# Patient Record
Sex: FEMALE | Race: WHITE | NOT HISPANIC OR LATINO
[De-identification: names, ages, dates, MRNs, and addresses within clinical notes are randomized per-mention and may not be internally consistent; named-entity substitution may affect disease eponyms.]

---

## 2017-01-12 ENCOUNTER — APPOINTMENT (OUTPATIENT)
Dept: HEART AND VASCULAR | Facility: CLINIC | Age: 77
End: 2017-01-12

## 2017-01-12 VITALS
WEIGHT: 134.2 LBS | RESPIRATION RATE: 12 BRPM | DIASTOLIC BLOOD PRESSURE: 79 MMHG | OXYGEN SATURATION: 96 % | SYSTOLIC BLOOD PRESSURE: 130 MMHG | TEMPERATURE: 97.8 F | BODY MASS INDEX: 23.78 KG/M2 | HEART RATE: 89 BPM | HEIGHT: 63 IN

## 2017-01-12 DIAGNOSIS — C76.1 MALIGNANT NEOPLASM OF THORAX: ICD-10-CM

## 2017-01-12 DIAGNOSIS — Z84.89 FAMILY HISTORY OF OTHER SPECIFIED CONDITIONS: ICD-10-CM

## 2017-01-12 DIAGNOSIS — Z82.0 FAMILY HISTORY OF EPILEPSY AND OTHER DISEASES OF THE NERVOUS SYSTEM: ICD-10-CM

## 2017-01-29 ENCOUNTER — EMERGENCY (EMERGENCY)
Facility: HOSPITAL | Age: 77
LOS: 1 days | Discharge: HOME | End: 2017-01-29
Attending: EMERGENCY MEDICINE | Admitting: EMERGENCY MEDICINE
Payer: MEDICARE

## 2017-01-29 VITALS
OXYGEN SATURATION: 96 % | DIASTOLIC BLOOD PRESSURE: 74 MMHG | SYSTOLIC BLOOD PRESSURE: 155 MMHG | RESPIRATION RATE: 18 BRPM | TEMPERATURE: 98 F | WEIGHT: 132.06 LBS | HEART RATE: 100 BPM | HEIGHT: 63 IN

## 2017-01-29 DIAGNOSIS — S83.004A UNSPECIFIED DISLOCATION OF RIGHT PATELLA, INITIAL ENCOUNTER: ICD-10-CM

## 2017-01-29 DIAGNOSIS — M25.561 PAIN IN RIGHT KNEE: ICD-10-CM

## 2017-01-29 PROCEDURE — 99284 EMERGENCY DEPT VISIT MOD MDM: CPT

## 2017-01-29 PROCEDURE — 70450 CT HEAD/BRAIN W/O DYE: CPT

## 2017-01-29 PROCEDURE — 70450 CT HEAD/BRAIN W/O DYE: CPT | Mod: 26

## 2017-01-29 PROCEDURE — 99284 EMERGENCY DEPT VISIT MOD MDM: CPT | Mod: 25

## 2017-01-29 NOTE — ED PROVIDER NOTE - CONSTITUTIONAL, MLM
normal... Well appearing, well nourished, awake, alert, oriented to person, place, time/situation and in mild distress, appears very anxious.

## 2017-01-29 NOTE — ED PROVIDER NOTE - MEDICAL DECISION MAKING DETAILS
76 year old female with left ear pressure and ? paresthesias of left leg x many weeks. Currently asymptomatic, ? anxiety. Head ct unremarkable. Will refer to neuro clinic.

## 2017-01-29 NOTE — ED ADULT TRIAGE NOTE - CHIEF COMPLAINT QUOTE
Patient c/o left ear pressure , head pressure and left leg numbness while walking outside today . Verbalizes going to so much stress at this time . History of panic attack , on xanax daily .

## 2017-01-29 NOTE — ED PROVIDER NOTE - OBJECTIVE STATEMENT
76 year old female with PMH anxiety and HL who presents to ED c/o a pressure in the left side of her head described as a fullness x many weeks. Pt reports that she has told her pmd about this. She has also experienced a "funny sensation" to her left shin. Most recently it occurred while she was walking today. Pt states that in the past she takes her anxiety medication and the symptoms resolve. She wants to make sure "nothing else is wrong." Denies dizziness, headache, vomiting focal weakness, syncope, chest pain, or sob.

## 2017-01-29 NOTE — ED PROVIDER NOTE - ENMT, MLM
Airway patent, Nasal mucosa clear. Mouth with normal mucosa. Throat has no vesicles, no oropharyngeal exudates and uvula is midline. TMs pearly gray, no erythema, no exudates. No mastoid tenderness.

## 2017-01-31 ENCOUNTER — TRANSCRIPTION ENCOUNTER (OUTPATIENT)
Age: 77
End: 2017-01-31

## 2017-02-13 ENCOUNTER — APPOINTMENT (OUTPATIENT)
Dept: INTERNAL MEDICINE | Facility: CLINIC | Age: 77
End: 2017-02-13

## 2017-02-13 ENCOUNTER — LABORATORY RESULT (OUTPATIENT)
Age: 77
End: 2017-02-13

## 2017-02-13 VITALS
DIASTOLIC BLOOD PRESSURE: 70 MMHG | BODY MASS INDEX: 23.74 KG/M2 | SYSTOLIC BLOOD PRESSURE: 120 MMHG | TEMPERATURE: 98 F | WEIGHT: 134 LBS | OXYGEN SATURATION: 95 % | HEIGHT: 63 IN | HEART RATE: 74 BPM | RESPIRATION RATE: 12 BRPM

## 2017-02-13 DIAGNOSIS — R42 DIZZINESS AND GIDDINESS: ICD-10-CM

## 2017-02-14 LAB
ALBUMIN SERPL ELPH-MCNC: 4.2 G/DL
ALP BLD-CCNC: 58 U/L
ALT SERPL-CCNC: 18 U/L
ANION GAP SERPL CALC-SCNC: 14 MMOL/L
AST SERPL-CCNC: 24 U/L
BILIRUB SERPL-MCNC: 0.8 MG/DL
BUN SERPL-MCNC: 15 MG/DL
CALCIUM SERPL-MCNC: 9.6 MG/DL
CHLORIDE SERPL-SCNC: 103 MMOL/L
CO2 SERPL-SCNC: 22 MMOL/L
CREAT SERPL-MCNC: 0.9 MG/DL
GLUCOSE SERPL-MCNC: 97 MG/DL
HBA1C MFR BLD HPLC: 5.6 %
MAGNESIUM SERPL-MCNC: 2.3 MG/DL
POTASSIUM SERPL-SCNC: 4.3 MMOL/L
PROT SERPL-MCNC: 6.8 G/DL
SODIUM SERPL-SCNC: 139 MMOL/L
TSH SERPL-ACNC: 2.17 UIU/ML

## 2017-02-17 ENCOUNTER — CLINICAL ADVICE (OUTPATIENT)
Age: 77
End: 2017-02-17

## 2017-04-23 ENCOUNTER — RX RENEWAL (OUTPATIENT)
Age: 77
End: 2017-04-23

## 2017-05-09 ENCOUNTER — TRANSCRIPTION ENCOUNTER (OUTPATIENT)
Age: 77
End: 2017-05-09

## 2017-05-15 ENCOUNTER — TRANSCRIPTION ENCOUNTER (OUTPATIENT)
Age: 77
End: 2017-05-15

## 2017-05-16 ENCOUNTER — TRANSCRIPTION ENCOUNTER (OUTPATIENT)
Age: 77
End: 2017-05-16

## 2017-06-07 ENCOUNTER — RX RENEWAL (OUTPATIENT)
Age: 77
End: 2017-06-07

## 2017-07-02 ENCOUNTER — RX RENEWAL (OUTPATIENT)
Age: 77
End: 2017-07-02

## 2017-08-19 ENCOUNTER — RX RENEWAL (OUTPATIENT)
Age: 77
End: 2017-08-19

## 2017-09-08 ENCOUNTER — TRANSCRIPTION ENCOUNTER (OUTPATIENT)
Age: 77
End: 2017-09-08

## 2017-10-02 ENCOUNTER — RX RENEWAL (OUTPATIENT)
Age: 77
End: 2017-10-02

## 2017-10-17 ENCOUNTER — RX RENEWAL (OUTPATIENT)
Age: 77
End: 2017-10-17

## 2017-11-27 ENCOUNTER — RX RENEWAL (OUTPATIENT)
Age: 77
End: 2017-11-27

## 2017-12-15 ENCOUNTER — LABORATORY RESULT (OUTPATIENT)
Age: 77
End: 2017-12-15

## 2017-12-15 ENCOUNTER — APPOINTMENT (OUTPATIENT)
Dept: HEART AND VASCULAR | Facility: CLINIC | Age: 77
End: 2017-12-15
Payer: MEDICARE

## 2017-12-15 VITALS
HEART RATE: 76 BPM | RESPIRATION RATE: 14 BRPM | TEMPERATURE: 97.6 F | BODY MASS INDEX: 25.87 KG/M2 | DIASTOLIC BLOOD PRESSURE: 76 MMHG | WEIGHT: 146 LBS | SYSTOLIC BLOOD PRESSURE: 134 MMHG | HEIGHT: 63 IN | OXYGEN SATURATION: 95 %

## 2017-12-15 DIAGNOSIS — Z84.89 FAMILY HISTORY OF OTHER SPECIFIED CONDITIONS: ICD-10-CM

## 2017-12-15 PROCEDURE — 93306 TTE W/DOPPLER COMPLETE: CPT | Mod: XE

## 2017-12-15 PROCEDURE — 99215 OFFICE O/P EST HI 40 MIN: CPT | Mod: 25

## 2017-12-15 PROCEDURE — 36415 COLL VENOUS BLD VENIPUNCTURE: CPT

## 2017-12-15 PROCEDURE — 93880 EXTRACRANIAL BILAT STUDY: CPT | Mod: XE

## 2017-12-15 PROCEDURE — 93000 ELECTROCARDIOGRAM COMPLETE: CPT

## 2017-12-18 LAB
25(OH)D3 SERPL-MCNC: 61.3 NG/ML
ALBUMIN SERPL ELPH-MCNC: 4.6 G/DL
ALP BLD-CCNC: 73 U/L
ALT SERPL-CCNC: 16 U/L
ANION GAP SERPL CALC-SCNC: 18 MMOL/L
APPEARANCE: ABNORMAL
AST SERPL-CCNC: 26 U/L
BASOPHILS # BLD AUTO: 0.06 K/UL
BASOPHILS NFR BLD AUTO: 0.8 %
BILIRUB SERPL-MCNC: 0.7 MG/DL
BILIRUBIN URINE: NEGATIVE
BLOOD URINE: NEGATIVE
BUN SERPL-MCNC: 21 MG/DL
CALCIUM SERPL-MCNC: 10.2 MG/DL
CHLORIDE SERPL-SCNC: 101 MMOL/L
CHOLEST SERPL-MCNC: 206 MG/DL
CHOLEST/HDLC SERPL: 2.5 RATIO
CO2 SERPL-SCNC: 23 MMOL/L
COLOR: YELLOW
CREAT SERPL-MCNC: 1.1 MG/DL
CREAT SPEC-SCNC: 50 MG/DL
CRP SERPL HS-MCNC: 1 MG/L
EOSINOPHIL # BLD AUTO: 0.54 K/UL
EOSINOPHIL NFR BLD AUTO: 7.2 %
FOLATE SERPL-MCNC: >20 NG/ML
GLUCOSE QUALITATIVE U: NEGATIVE MG/DL
GLUCOSE SERPL-MCNC: 96 MG/DL
HBA1C MFR BLD HPLC: 5.9 %
HCT VFR BLD CALC: 38.9 %
HDLC SERPL-MCNC: 84 MG/DL
HGB BLD-MCNC: 12.5 G/DL
IMM GRANULOCYTES NFR BLD AUTO: 0.3 %
KETONES URINE: NEGATIVE
LDLC SERPL CALC-MCNC: 93 MG/DL
LEUKOCYTE ESTERASE URINE: ABNORMAL
LYMPHOCYTES # BLD AUTO: 1.39 K/UL
LYMPHOCYTES NFR BLD AUTO: 18.6 %
MAGNESIUM SERPL-MCNC: 2.2 MG/DL
MAN DIFF?: NORMAL
MCHC RBC-ENTMCNC: 29.9 PG
MCHC RBC-ENTMCNC: 32.1 GM/DL
MCV RBC AUTO: 93.1 FL
MICROALBUMIN 24H UR DL<=1MG/L-MCNC: <0.3 MG/DL
MICROALBUMIN/CREAT 24H UR-RTO: NORMAL
MONOCYTES # BLD AUTO: 0.57 K/UL
MONOCYTES NFR BLD AUTO: 7.6 %
NEUTROPHILS # BLD AUTO: 4.89 K/UL
NEUTROPHILS NFR BLD AUTO: 65.5 %
NITRITE URINE: NEGATIVE
PH URINE: 6.5
PLATELET # BLD AUTO: 231 K/UL
POTASSIUM SERPL-SCNC: 4.8 MMOL/L
PROT SERPL-MCNC: 7.4 G/DL
PROTEIN URINE: NEGATIVE MG/DL
RBC # BLD: 4.18 M/UL
RBC # FLD: 14.3 %
SODIUM SERPL-SCNC: 142 MMOL/L
SPECIFIC GRAVITY URINE: 1.01
TRIGL SERPL-MCNC: 144 MG/DL
TSH SERPL-ACNC: 3.47 UIU/ML
UROBILINOGEN URINE: NEGATIVE MG/DL
VIT B12 SERPL-MCNC: >2000 PG/ML
WBC # FLD AUTO: 7.47 K/UL

## 2018-01-08 ENCOUNTER — RX RENEWAL (OUTPATIENT)
Age: 78
End: 2018-01-08

## 2018-01-09 ENCOUNTER — TRANSCRIPTION ENCOUNTER (OUTPATIENT)
Age: 78
End: 2018-01-09

## 2018-03-01 ENCOUNTER — MEDICATION RENEWAL (OUTPATIENT)
Age: 78
End: 2018-03-01

## 2018-03-12 RX ORDER — ESCITALOPRAM OXALATE 10 MG/1
10 TABLET ORAL DAILY
Qty: 90 | Refills: 1 | Status: DISCONTINUED | COMMUNITY
Start: 2017-01-12 | End: 2018-03-12

## 2018-04-02 ENCOUNTER — APPOINTMENT (OUTPATIENT)
Dept: HEART AND VASCULAR | Facility: CLINIC | Age: 78
End: 2018-04-02
Payer: MEDICARE

## 2018-04-02 VITALS — SYSTOLIC BLOOD PRESSURE: 120 MMHG | DIASTOLIC BLOOD PRESSURE: 68 MMHG | HEART RATE: 75 BPM

## 2018-04-02 PROCEDURE — 93325 DOPPLER ECHO COLOR FLOW MAPG: CPT

## 2018-04-02 PROCEDURE — 93320 DOPPLER ECHO COMPLETE: CPT

## 2018-04-02 PROCEDURE — 93351 STRESS TTE COMPLETE: CPT

## 2018-04-20 ENCOUNTER — APPOINTMENT (OUTPATIENT)
Dept: HEART AND VASCULAR | Facility: CLINIC | Age: 78
End: 2018-04-20

## 2018-06-06 ENCOUNTER — LABORATORY RESULT (OUTPATIENT)
Age: 78
End: 2018-06-06

## 2018-06-06 ENCOUNTER — APPOINTMENT (OUTPATIENT)
Dept: HEART AND VASCULAR | Facility: CLINIC | Age: 78
End: 2018-06-06
Payer: MEDICARE

## 2018-06-06 VITALS
HEART RATE: 73 BPM | OXYGEN SATURATION: 97 % | SYSTOLIC BLOOD PRESSURE: 130 MMHG | TEMPERATURE: 97.5 F | DIASTOLIC BLOOD PRESSURE: 80 MMHG | HEIGHT: 63 IN | RESPIRATION RATE: 14 BRPM | BODY MASS INDEX: 26.4 KG/M2 | WEIGHT: 149 LBS

## 2018-06-06 DIAGNOSIS — C44.529 SQUAMOUS CELL CARCINOMA OF SKIN OF OTHER PART OF TRUNK: ICD-10-CM

## 2018-06-06 DIAGNOSIS — Z87.440 PERSONAL HISTORY OF URINARY (TRACT) INFECTIONS: ICD-10-CM

## 2018-06-06 PROCEDURE — 93000 ELECTROCARDIOGRAM COMPLETE: CPT

## 2018-06-06 PROCEDURE — 36415 COLL VENOUS BLD VENIPUNCTURE: CPT

## 2018-06-06 PROCEDURE — 93306 TTE W/DOPPLER COMPLETE: CPT | Mod: XE

## 2018-06-06 PROCEDURE — 99215 OFFICE O/P EST HI 40 MIN: CPT | Mod: 25

## 2018-06-07 PROBLEM — C44.529 SQUAMOUS CELL CARCINOMA OF SKIN OF TRUNK: Status: RESOLVED | Noted: 2018-06-07 | Resolved: 2018-06-07

## 2018-06-07 PROBLEM — Z87.440 HISTORY OF RECURRENT URINARY TRACT INFECTION: Status: RESOLVED | Noted: 2018-06-07 | Resolved: 2018-06-07

## 2018-06-07 LAB
25(OH)D3 SERPL-MCNC: 51 NG/ML
ALBUMIN SERPL ELPH-MCNC: 4.5 G/DL
ALP BLD-CCNC: 68 U/L
ALT SERPL-CCNC: 16 U/L
ANION GAP SERPL CALC-SCNC: 17 MMOL/L
APPEARANCE: ABNORMAL
AST SERPL-CCNC: 27 U/L
BASOPHILS # BLD AUTO: 0.09 K/UL
BASOPHILS NFR BLD AUTO: 1.4 %
BILIRUB SERPL-MCNC: 0.8 MG/DL
BILIRUBIN URINE: NEGATIVE
BLOOD URINE: NEGATIVE
BUN SERPL-MCNC: 23 MG/DL
CALCIUM SERPL-MCNC: 10.5 MG/DL
CHLORIDE SERPL-SCNC: 100 MMOL/L
CHOLEST SERPL-MCNC: 218 MG/DL
CHOLEST/HDLC SERPL: 2.2 RATIO
CO2 SERPL-SCNC: 25 MMOL/L
COLOR: YELLOW
CREAT SERPL-MCNC: 0.98 MG/DL
CRP SERPL HS-MCNC: 1.9 MG/L
EOSINOPHIL # BLD AUTO: 0.46 K/UL
EOSINOPHIL NFR BLD AUTO: 7.2 %
FOLATE SERPL-MCNC: >20 NG/ML
GLUCOSE QUALITATIVE U: NEGATIVE MG/DL
GLUCOSE SERPL-MCNC: 116 MG/DL
HBA1C MFR BLD HPLC: 5.7 %
HCT VFR BLD CALC: 37.3 %
HDLC SERPL-MCNC: 97 MG/DL
HGB BLD-MCNC: 12.2 G/DL
IMM GRANULOCYTES NFR BLD AUTO: 0.3 %
IRON SATN MFR SERPL: 15 %
IRON SERPL-MCNC: 54 UG/DL
KETONES URINE: ABNORMAL
LDLC SERPL CALC-MCNC: 97 MG/DL
LEUKOCYTE ESTERASE URINE: ABNORMAL
LYMPHOCYTES # BLD AUTO: 1.07 K/UL
LYMPHOCYTES NFR BLD AUTO: 16.8 %
MAGNESIUM SERPL-MCNC: 2 MG/DL
MAN DIFF?: NORMAL
MCHC RBC-ENTMCNC: 29.9 PG
MCHC RBC-ENTMCNC: 32.7 GM/DL
MCV RBC AUTO: 91.4 FL
MONOCYTES # BLD AUTO: 0.7 K/UL
MONOCYTES NFR BLD AUTO: 11 %
NEUTROPHILS # BLD AUTO: 4.04 K/UL
NEUTROPHILS NFR BLD AUTO: 63.3 %
NITRITE URINE: NEGATIVE
PH URINE: 6
PLATELET # BLD AUTO: 240 K/UL
POTASSIUM SERPL-SCNC: 4.7 MMOL/L
PROT SERPL-MCNC: 7.3 G/DL
PROTEIN URINE: NEGATIVE MG/DL
RBC # BLD: 4.08 M/UL
RBC # FLD: 14.2 %
SODIUM SERPL-SCNC: 142 MMOL/L
SPECIFIC GRAVITY URINE: 1.02
TIBC SERPL-MCNC: 354 UG/DL
TRIGL SERPL-MCNC: 121 MG/DL
UIBC SERPL-MCNC: 300 UG/DL
UROBILINOGEN URINE: NEGATIVE MG/DL
VIT B12 SERPL-MCNC: 1415 PG/ML
WBC # FLD AUTO: 6.38 K/UL

## 2018-06-07 RX ORDER — ACETAZOLAMIDE 250 MG/1
250 TABLET ORAL
Qty: 30 | Refills: 0 | Status: DISCONTINUED | COMMUNITY
Start: 2017-10-19 | End: 2018-06-07

## 2018-06-07 RX ORDER — AMPICILLIN 500 MG/1
500 CAPSULE ORAL EVERY 8 HOURS
Qty: 15 | Refills: 0 | Status: ACTIVE | COMMUNITY
Start: 2018-06-07 | End: 1900-01-01

## 2018-06-09 LAB — THYROGLOB AB SERPL IA-ACNC: <1.8 IU/ML

## 2018-06-10 LAB — UBIQUINONE10 SERPL-MCNC: 0.52 MG/L

## 2018-07-08 ENCOUNTER — RX RENEWAL (OUTPATIENT)
Age: 78
End: 2018-07-08

## 2018-08-09 ENCOUNTER — RX RENEWAL (OUTPATIENT)
Age: 78
End: 2018-08-09

## 2018-09-05 ENCOUNTER — TRANSCRIPTION ENCOUNTER (OUTPATIENT)
Age: 78
End: 2018-09-05

## 2018-09-10 ENCOUNTER — APPOINTMENT (OUTPATIENT)
Dept: HEART AND VASCULAR | Facility: CLINIC | Age: 78
End: 2018-09-10
Payer: MEDICARE

## 2018-09-10 VITALS
HEIGHT: 63 IN | SYSTOLIC BLOOD PRESSURE: 120 MMHG | DIASTOLIC BLOOD PRESSURE: 78 MMHG | OXYGEN SATURATION: 94 % | HEART RATE: 84 BPM | TEMPERATURE: 98.4 F | WEIGHT: 152 LBS | BODY MASS INDEX: 26.93 KG/M2

## 2018-09-10 PROBLEM — E78.00 PURE HYPERCHOLESTEROLEMIA, UNSPECIFIED: Chronic | Status: ACTIVE | Noted: 2017-01-29

## 2018-09-10 PROBLEM — F41.9 ANXIETY DISORDER, UNSPECIFIED: Chronic | Status: ACTIVE | Noted: 2017-01-29

## 2018-09-10 PROCEDURE — 99214 OFFICE O/P EST MOD 30 MIN: CPT

## 2018-09-11 ENCOUNTER — RX RENEWAL (OUTPATIENT)
Age: 78
End: 2018-09-11

## 2018-10-08 ENCOUNTER — RX RENEWAL (OUTPATIENT)
Age: 78
End: 2018-10-08

## 2018-11-12 ENCOUNTER — RX RENEWAL (OUTPATIENT)
Age: 78
End: 2018-11-12

## 2018-12-10 ENCOUNTER — APPOINTMENT (OUTPATIENT)
Dept: HEART AND VASCULAR | Facility: CLINIC | Age: 78
End: 2018-12-10
Payer: MEDICARE

## 2018-12-10 VITALS
TEMPERATURE: 97.9 F | RESPIRATION RATE: 14 BRPM | BODY MASS INDEX: 26.93 KG/M2 | OXYGEN SATURATION: 94 % | WEIGHT: 152 LBS | HEART RATE: 82 BPM | HEIGHT: 63 IN

## 2018-12-10 VITALS — SYSTOLIC BLOOD PRESSURE: 120 MMHG | DIASTOLIC BLOOD PRESSURE: 70 MMHG

## 2018-12-10 PROCEDURE — 99214 OFFICE O/P EST MOD 30 MIN: CPT

## 2018-12-16 NOTE — REVIEW OF SYSTEMS
[Recent Weight Gain (___ Lbs)] : recent [unfilled] ~Ulb weight gain [Loss Of Hearing] : hearing loss [Shortness Of Breath] : no shortness of breath [Palpitations] : palpitations [Negative] : Heme/Lymph

## 2018-12-16 NOTE — DISCUSSION/SUMMARY
[Coronary Artery Disease] : coronary artery disease [Essential Hypertension] : essential hypertension [Stable] : stable [Responding to Treatment] : responding to treatment [Moderate Mitral Regurgitation] : moderate mitral regurgitation [Compensated] : compensated [Outpatient Evaluation] : outpatient evaluation [Stress Echocardiogram] : stress echocardiogram [None] : none [With Me] : with me [FreeTextEntry1] : medications reconciled \par \par Increase dose of escitalopram to 10 mg po qhs\par \par return in January for fasting blood work\par \par counseled on diet to achieve weight loss

## 2018-12-16 NOTE — REASON FOR VISIT
[Follow-Up - Clinic] : a clinic follow-up of [Hyperlipidemia] : hyperlipidemia [Hypertension] : hypertension [Medication Management] : Medication management [Mitral Regurgitation] : mitral regurgitation [FreeTextEntry1] : 78 year old anxious   with hx of hyperlipidemia and mild hypertension presents for follow up. These days she continues to gain  weight  and has some  dyspnea on exertion.      She is anxious and depressed but not suicidal. \par \par Has documented  moderate mitral regurgitation \par \par \par  She wears hearing aides \par \par \par

## 2018-12-16 NOTE — ASSESSMENT
[FreeTextEntry1] : Controlled HTN\par \par palpitations  likely anxiety related \par \par \par moderate mitral  regurgitation \par \par \par weight gain secondary to excess calories / carbohydrates\par

## 2018-12-16 NOTE — PHYSICAL EXAM
[General Appearance - Well Developed] : well developed [Normal Appearance] : normal appearance [Well Groomed] : well groomed [General Appearance - Well Nourished] : well nourished [No Deformities] : no deformities [General Appearance - In No Acute Distress] : no acute distress [Normal Conjunctiva] : the conjunctiva exhibited no abnormalities [Eyelids - No Xanthelasma] : the eyelids demonstrated no xanthelasmas [No Oral Cyanosis] : no oral cyanosis [Normal Jugular Venous A Waves Present] : normal jugular venous A waves present [Normal Jugular Venous V Waves Present] : normal jugular venous V waves present [No Jugular Venous Amaro A Waves] : no jugular venous amaro A waves [Respiration, Rhythm And Depth] : normal respiratory rhythm and effort [Exaggerated Use Of Accessory Muscles For Inspiration] : no accessory muscle use [Auscultation Breath Sounds / Voice Sounds] : lungs were clear to auscultation bilaterally [Heart Rate And Rhythm] : heart rate and rhythm were normal [Heart Sounds] : normal S1 and S2 [Arterial Pulses Normal] : the arterial pulses were normal [Edema] : no peripheral edema present [Systolic grade ___/6] : A grade [unfilled]/6 systolic murmur was heard. [FreeTextEntry1] : normal capillary refill [Abdomen Mass (___ Cm)] : no abdominal mass palpated [Abnormal Walk] : normal gait [Gait - Sufficient For Exercise Testing] : the gait was sufficient for exercise testing [Nail Clubbing] : no clubbing of the fingernails [Cyanosis, Localized] : no localized cyanosis [Petechial Hemorrhages (___cm)] : no petechial hemorrhages [Nail Splinter Hemorrhages] : no splinter hemorrhages of the nails [Fingers Osler's Nodes] : Osler's nodes were not seenon the fingers [Skin Color & Pigmentation] : normal skin color and pigmentation [Skin Turgor] : normal skin turgor [] : no rash [No Venous Stasis] : no venous stasis [Skin Lesions] : no skin lesions [No Skin Ulcers] : no skin ulcer [No Xanthoma] : no  xanthoma was observed [Oriented To Time, Place, And Person] : oriented to person, place, and time [Impaired Insight] : insight and judgment were intact [Affect] : the affect was normal [Mood] : the mood was normal [Memory Recent] : recent memory was not impaired [Memory Remote] : remote memory was not impaired

## 2018-12-16 NOTE — HISTORY OF PRESENT ILLNESS
[FreeTextEntry1] : Concerned about her weight gain because her brother  is prediabetic \par \par She has palpitations mostly at night while sleeping on her right side.  Lasts seconds  and is not accompanied by pain, dyspnea, dizziness or syncope.    \par \par She knows her weight gain is secondary to poor dietary intake

## 2018-12-20 ENCOUNTER — TRANSCRIPTION ENCOUNTER (OUTPATIENT)
Age: 78
End: 2018-12-20

## 2019-01-10 ENCOUNTER — APPOINTMENT (OUTPATIENT)
Dept: INTERNAL MEDICINE | Facility: CLINIC | Age: 79
End: 2019-01-10
Payer: MEDICARE

## 2019-01-10 VITALS
DIASTOLIC BLOOD PRESSURE: 80 MMHG | OXYGEN SATURATION: 94 % | HEIGHT: 63 IN | TEMPERATURE: 97.7 F | HEART RATE: 74 BPM | WEIGHT: 147 LBS | SYSTOLIC BLOOD PRESSURE: 140 MMHG | BODY MASS INDEX: 26.05 KG/M2

## 2019-01-10 DIAGNOSIS — Z01.818 ENCOUNTER FOR OTHER PREPROCEDURAL EXAMINATION: ICD-10-CM

## 2019-01-10 DIAGNOSIS — H93.8X2 OTHER SPECIFIED DISORDERS OF LEFT EAR: ICD-10-CM

## 2019-01-10 PROCEDURE — 93000 ELECTROCARDIOGRAM COMPLETE: CPT

## 2019-01-10 PROCEDURE — G0008: CPT

## 2019-01-10 PROCEDURE — 36415 COLL VENOUS BLD VENIPUNCTURE: CPT

## 2019-01-10 PROCEDURE — 90662 IIV NO PRSV INCREASED AG IM: CPT

## 2019-01-10 PROCEDURE — 99214 OFFICE O/P EST MOD 30 MIN: CPT

## 2019-01-11 ENCOUNTER — RX RENEWAL (OUTPATIENT)
Age: 79
End: 2019-01-11

## 2019-01-12 NOTE — REVIEW OF SYSTEMS
[Recent Weight Gain (___ Lbs)] : recent [unfilled] ~Ulb weight gain [Loss Of Hearing] : hearing loss [Shortness Of Breath] : no shortness of breath [Palpitations] : palpitations [Depression] : depression [Anxiety] : anxiety [Suicidal] : not suicidal [Negative] : Heme/Lymph

## 2019-01-12 NOTE — REASON FOR VISIT
[Follow-Up - Clinic] : a clinic follow-up of [Hypertension] : hypertension [Mitral Regurgitation] : mitral regurgitation [FreeTextEntry1] : 78 year old anxious   with hx of hyperlipidemia and mild hypertension presents for follow up. These days she continues to gain  weight  and has some  dyspnea on exertion.      She is anxious and depressed but not suicidal. \par \par Has documented  moderate mitral regurgitation \par \par \par  She wears hearing aides \par \par \par

## 2019-01-12 NOTE — ASSESSMENT
[FreeTextEntry1] : Controlled HTN\par \par anxiety/depression\par \par \par weight gain secondary to calories excess

## 2019-01-12 NOTE — DISCUSSION/SUMMARY
[Essential Hypertension] : essential hypertension [Stable] : stable [Responding to Treatment] : responding to treatment [None] : none [___ Month(s)] : [unfilled] month(s) [With Me] : with me [FreeTextEntry1] : renew alprazolam for anxiety\par \par counseled regarding weight loss through carbohydrate reduction \par \par Exercise with brisk walking  30 minutes daily \par \par venipuncture performed \par \par medications reconciled

## 2019-01-14 LAB
25(OH)D3 SERPL-MCNC: 58.8 NG/ML
ALBUMIN SERPL ELPH-MCNC: 4.6 G/DL
ALP BLD-CCNC: 70 U/L
ALT SERPL-CCNC: 13 U/L
ANION GAP SERPL CALC-SCNC: 12 MMOL/L
APPEARANCE: CLEAR
AST SERPL-CCNC: 20 U/L
BACTERIA: NEGATIVE
BASOPHILS # BLD AUTO: 0.04 K/UL
BASOPHILS NFR BLD AUTO: 0.7 %
BILIRUB SERPL-MCNC: 0.7 MG/DL
BILIRUBIN URINE: NEGATIVE
BLOOD URINE: NEGATIVE
BUN SERPL-MCNC: 16 MG/DL
CALCIUM SERPL-MCNC: 9.9 MG/DL
CHLORIDE SERPL-SCNC: 102 MMOL/L
CHOLEST SERPL-MCNC: 224 MG/DL
CHOLEST/HDLC SERPL: 2.6 RATIO
CO2 SERPL-SCNC: 26 MMOL/L
COLOR: YELLOW
CREAT SERPL-MCNC: 0.9 MG/DL
EOSINOPHIL # BLD AUTO: 0.38 K/UL
EOSINOPHIL NFR BLD AUTO: 6.3 %
FOLATE SERPL-MCNC: >20 NG/ML
GLUCOSE QUALITATIVE U: NEGATIVE MG/DL
GLUCOSE SERPL-MCNC: 94 MG/DL
HBA1C MFR BLD HPLC: 5.9 %
HCT VFR BLD CALC: 38.7 %
HDLC SERPL-MCNC: 86 MG/DL
HGB BLD-MCNC: 12.1 G/DL
HYALINE CASTS: 2 /LPF
IMM GRANULOCYTES NFR BLD AUTO: 0.3 %
KETONES URINE: NEGATIVE
LDLC SERPL CALC-MCNC: 116 MG/DL
LEUKOCYTE ESTERASE URINE: NEGATIVE
LYMPHOCYTES # BLD AUTO: 1.6 K/UL
LYMPHOCYTES NFR BLD AUTO: 26.4 %
MAGNESIUM SERPL-MCNC: 2.4 MG/DL
MAN DIFF?: NORMAL
MCHC RBC-ENTMCNC: 29.4 PG
MCHC RBC-ENTMCNC: 31.3 GM/DL
MCV RBC AUTO: 94.2 FL
MICROSCOPIC-UA: NORMAL
MONOCYTES # BLD AUTO: 0.58 K/UL
MONOCYTES NFR BLD AUTO: 9.6 %
NEUTROPHILS # BLD AUTO: 3.43 K/UL
NEUTROPHILS NFR BLD AUTO: 56.7 %
NITRITE URINE: NEGATIVE
PH URINE: 7
PLATELET # BLD AUTO: 217 K/UL
POTASSIUM SERPL-SCNC: 4.4 MMOL/L
PROT SERPL-MCNC: 7.1 G/DL
PROTEIN URINE: NEGATIVE MG/DL
RBC # BLD: 4.11 M/UL
RBC # FLD: 15.3 %
RED BLOOD CELLS URINE: 1 /HPF
SODIUM SERPL-SCNC: 140 MMOL/L
SPECIFIC GRAVITY URINE: 1.02
SQUAMOUS EPITHELIAL CELLS: 7 /HPF
TRIGL SERPL-MCNC: 110 MG/DL
TSH SERPL-ACNC: 2.13 UIU/ML
UROBILINOGEN URINE: NEGATIVE MG/DL
VIT B12 SERPL-MCNC: 1507 PG/ML
WBC # FLD AUTO: 6.05 K/UL
WHITE BLOOD CELLS URINE: 4 /HPF

## 2019-01-15 LAB — UBIQUINONE10 SERPL-MCNC: 0.65 MG/L

## 2019-02-08 ENCOUNTER — APPOINTMENT (OUTPATIENT)
Dept: HEART AND VASCULAR | Facility: CLINIC | Age: 79
End: 2019-02-08
Payer: MEDICARE

## 2019-02-08 PROCEDURE — 99214 OFFICE O/P EST MOD 30 MIN: CPT

## 2019-02-10 VITALS
DIASTOLIC BLOOD PRESSURE: 74 MMHG | WEIGHT: 150 LBS | RESPIRATION RATE: 14 BRPM | OXYGEN SATURATION: 96 % | HEART RATE: 70 BPM | BODY MASS INDEX: 26.58 KG/M2 | SYSTOLIC BLOOD PRESSURE: 124 MMHG | HEIGHT: 63 IN

## 2019-02-10 NOTE — DISCUSSION/SUMMARY
[FreeTextEntry1] : discussed need for increased activity 30 minutes exercise bicycle exercise at home daily \par \par Stop eating ice cream, cookies, cake, candy    and simple carbs  - discussed consumption of low glycemic index carbohydrates  and always eating a carb with a protein to offset effects on serum glucose - also discussed benefits of Weight Watchers Diet\par \par \par follow up in 3 to 6 months \par \par

## 2019-02-10 NOTE — PHYSICAL EXAM
[General Appearance - Well Developed] : well developed [Normal Appearance] : normal appearance [Well Groomed] : well groomed [General Appearance - Well Nourished] : well nourished [No Deformities] : no deformities [General Appearance - In No Acute Distress] : no acute distress [Normal Conjunctiva] : the conjunctiva exhibited no abnormalities [Eyelids - No Xanthelasma] : the eyelids demonstrated no xanthelasmas [No Oral Cyanosis] : no oral cyanosis [] : no respiratory distress [Respiration, Rhythm And Depth] : normal respiratory rhythm and effort [Exaggerated Use Of Accessory Muscles For Inspiration] : no accessory muscle use [Auscultation Breath Sounds / Voice Sounds] : lungs were clear to auscultation bilaterally [Heart Rate And Rhythm] : heart rate and rhythm were normal [Heart Sounds] : normal S1 and S2 [Arterial Pulses Normal] : the arterial pulses were normal [Edema] : no peripheral edema present [Systolic grade ___/6] : A grade [unfilled]/6 systolic murmur was heard. [FreeTextEntry1] : normal capillary refill [Abnormal Walk] : normal gait [Gait - Sufficient For Exercise Testing] : the gait was sufficient for exercise testing [Nail Clubbing] : no clubbing of the fingernails [Cyanosis, Localized] : no localized cyanosis [Skin Color & Pigmentation] : normal skin color and pigmentation [Oriented To Time, Place, And Person] : oriented to person, place, and time

## 2019-02-10 NOTE — ASSESSMENT
[FreeTextEntry1] : controlled HTN\par \par prediabetes  and weight secondary to dietary indiscretion

## 2019-02-10 NOTE — REVIEW OF SYSTEMS
[Recent Weight Gain (___ Lbs)] : recent [unfilled] ~Ulb weight gain [Loss Of Hearing] : hearing loss [Shortness Of Breath] : no shortness of breath [Palpitations] : no palpitations [Depression] : depression [Anxiety] : anxiety [Suicidal] : not suicidal [Negative] : Heme/Lymph

## 2019-02-10 NOTE — HISTORY OF PRESENT ILLNESS
[FreeTextEntry1] : here for brief follow up : struggles with diet . Knows she is eating too much carbohydrates - sweets and junk food and has not been exercising. \par \par Most recent Hgba1c  mildly elevated  but still below 6.0%\par \par

## 2019-02-11 ENCOUNTER — RX RENEWAL (OUTPATIENT)
Age: 79
End: 2019-02-11

## 2019-03-11 ENCOUNTER — RX RENEWAL (OUTPATIENT)
Age: 79
End: 2019-03-11

## 2019-03-11 ENCOUNTER — TRANSCRIPTION ENCOUNTER (OUTPATIENT)
Age: 79
End: 2019-03-11

## 2019-04-14 ENCOUNTER — RX RENEWAL (OUTPATIENT)
Age: 79
End: 2019-04-14

## 2019-05-08 ENCOUNTER — APPOINTMENT (OUTPATIENT)
Dept: HEART AND VASCULAR | Facility: CLINIC | Age: 79
End: 2019-05-08
Payer: MEDICARE

## 2019-05-08 ENCOUNTER — LABORATORY RESULT (OUTPATIENT)
Age: 79
End: 2019-05-08

## 2019-05-08 VITALS
DIASTOLIC BLOOD PRESSURE: 76 MMHG | BODY MASS INDEX: 26.05 KG/M2 | RESPIRATION RATE: 14 BRPM | TEMPERATURE: 97.9 F | HEIGHT: 63 IN | HEART RATE: 76 BPM | WEIGHT: 147 LBS | SYSTOLIC BLOOD PRESSURE: 138 MMHG | OXYGEN SATURATION: 95 %

## 2019-05-08 DIAGNOSIS — R30.0 DYSURIA: ICD-10-CM

## 2019-05-08 PROCEDURE — 99213 OFFICE O/P EST LOW 20 MIN: CPT

## 2019-05-10 LAB
APPEARANCE: ABNORMAL
BILIRUBIN URINE: NEGATIVE
BLOOD URINE: ABNORMAL
COLOR: ABNORMAL
GLUCOSE QUALITATIVE U: NEGATIVE
KETONES URINE: NEGATIVE
LEUKOCYTE ESTERASE URINE: ABNORMAL
NITRITE URINE: POSITIVE
PH URINE: 5.5
PROTEIN URINE: ABNORMAL
SPECIFIC GRAVITY URINE: 1.03
UROBILINOGEN URINE: NORMAL

## 2019-05-11 RX ORDER — AMOXICILLIN AND CLAVULANATE POTASSIUM 500; 125 MG/1; MG/1
500-125 TABLET, FILM COATED ORAL
Qty: 14 | Refills: 0 | Status: ACTIVE | COMMUNITY
Start: 2017-12-18 | End: 1900-01-01

## 2019-05-12 NOTE — REVIEW OF SYSTEMS
[Recent Weight Gain (___ Lbs)] : recent [unfilled] ~Ulb weight gain [Loss Of Hearing] : hearing loss [Incontinence] : incontinence [Dysuria] : dysuria [Depression] : depression [Anxiety] : anxiety [Negative] : Heme/Lymph [Shortness Of Breath] : no shortness of breath [Palpitations] : no palpitations [Pelvic Pain] : no pelvic pain [Dysmenorrhea] : no dysmenorrhea [Vaginal Discharge] : no vaginal discharge [Suicidal] : not suicidal [Abn Vaginal Bleeding] : no unexplained vaginal bleeding

## 2019-05-12 NOTE — ASSESSMENT
[FreeTextEntry1] : stable hemodynamics\par \par mild prediabetes \par \par \par urinary incontinence and dysuria \par \par hx of bladder prolapse \par \par compensated moderate MR

## 2019-05-12 NOTE — DISCUSSION/SUMMARY
[FreeTextEntry1] : patient instructed to resume use of pessary \par \par urinalysis with culture \par \par dermatology referral provided

## 2019-05-12 NOTE — PHYSICAL EXAM
[Normal Appearance] : normal appearance [General Appearance - Well Developed] : well developed [Well Groomed] : well groomed [No Deformities] : no deformities [General Appearance - Well Nourished] : well nourished [General Appearance - In No Acute Distress] : no acute distress [Normal Conjunctiva] : the conjunctiva exhibited no abnormalities [Eyelids - No Xanthelasma] : the eyelids demonstrated no xanthelasmas [No Oral Cyanosis] : no oral cyanosis [Normal Jugular Venous V Waves Present] : normal jugular venous V waves present [No Jugular Venous Amaro A Waves] : no jugular venous amaro A waves [Normal Jugular Venous A Waves Present] : normal jugular venous A waves present [Respiration, Rhythm And Depth] : normal respiratory rhythm and effort [Exaggerated Use Of Accessory Muscles For Inspiration] : no accessory muscle use [Heart Rate And Rhythm] : heart rate and rhythm were normal [Heart Sounds] : normal S1 and S2 [Auscultation Breath Sounds / Voice Sounds] : lungs were clear to auscultation bilaterally [Edema] : no peripheral edema present [Arterial Pulses Normal] : the arterial pulses were normal [Systolic grade ___/6] : A grade [unfilled]/6 systolic murmur was heard. [Abdomen Mass (___ Cm)] : no abdominal mass palpated [Abnormal Walk] : normal gait [Gait - Sufficient For Exercise Testing] : the gait was sufficient for exercise testing [Nail Clubbing] : no clubbing of the fingernails [Cyanosis, Localized] : no localized cyanosis [Skin Turgor] : normal skin turgor [] : no rash [Skin Color & Pigmentation] : normal skin color and pigmentation [Oriented To Time, Place, And Person] : oriented to person, place, and time [FreeTextEntry1] : white papule left side of cheek

## 2019-05-12 NOTE — REASON FOR VISIT
[Hypertension] : hypertension [Mitral Regurgitation] : mitral regurgitation [Follow-Up - Clinic] : a clinic follow-up of [FreeTextEntry1] : 78 year old anxious   with hx of hyperlipidemia and mild hypertension presents for evaluation of recent incontinence  and dysuria. \par \par These days she continues to gain  weight  and has some  dyspnea on exertion.      She is anxious and depressed but not suicidal. \par \par Has documented  moderate mitral regurgitation \par \par She wears hearing aides \par \par \par

## 2019-05-12 NOTE — HISTORY OF PRESENT ILLNESS
[FreeTextEntry1] : Reports that over the past couple of months , she has lost control of her bladder  twice  without realizing.  In the past , she reports being diagnosed with bladder prolapse and was provided a pessary which she stopped using in the remote past.   She denies hematuria, dysuria , fevers, chills, or night sweats . \par \par In the past, we diagnosed her with  E. coli  UTI , pansensitive

## 2019-05-13 ENCOUNTER — RX RENEWAL (OUTPATIENT)
Age: 79
End: 2019-05-13

## 2019-06-12 ENCOUNTER — RX RENEWAL (OUTPATIENT)
Age: 79
End: 2019-06-12

## 2019-07-31 ENCOUNTER — APPOINTMENT (OUTPATIENT)
Dept: HEART AND VASCULAR | Facility: CLINIC | Age: 79
End: 2019-07-31
Payer: MEDICARE

## 2019-07-31 VITALS
WEIGHT: 146 LBS | HEIGHT: 63 IN | TEMPERATURE: 97.8 F | SYSTOLIC BLOOD PRESSURE: 120 MMHG | BODY MASS INDEX: 25.87 KG/M2 | OXYGEN SATURATION: 96 % | DIASTOLIC BLOOD PRESSURE: 72 MMHG | HEART RATE: 101 BPM
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
## 2019-07-31 PROCEDURE — 93000 ELECTROCARDIOGRAM COMPLETE: CPT

## 2019-07-31 PROCEDURE — 99213 OFFICE O/P EST LOW 20 MIN: CPT

## 2019-07-31 RX ORDER — FEXOFENADINE HCL 60 MG/1
60 TABLET, FILM COATED ORAL DAILY
Qty: 14 | Refills: 0 | Status: ACTIVE | COMMUNITY
Start: 2019-07-31 | End: 1900-01-01

## 2019-08-02 ENCOUNTER — RX RENEWAL (OUTPATIENT)
Age: 79
End: 2019-08-02
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
## 2019-08-04 NOTE — DISCUSSION/SUMMARY
[Essential Hypertension] : essential hypertension [Stable] : stable [Responding to Treatment] : responding to treatment [None] : none [Moderate Mitral Regurgitation] : moderate mitral regurgitation [Compensated] : compensated [Outpatient Evaluation] : outpatient evaluation [BNP] : B-type natriuretic peptide [___ Month(s)] : [unfilled] month(s) [Stress Echocardiogram] : stress echocardiogram [With Me] : with me [FreeTextEntry1] : Stable hemodynamics\par \par Refer to ENT for evaluation of left ear discomfort and loss of hearing \par \par Once leg bruising heal,   will set up stress echocardiogram

## 2019-08-04 NOTE — ASSESSMENT
[FreeTextEntry1] : well controlled HTN\par \par Moderate MR\par \par anxiety/depression\par \par hearing loss \par \par S/p recent trip and fall  with leg bruising

## 2019-08-04 NOTE — REASON FOR VISIT
[Coronary Artery Disease] : coronary artery disease [Follow-Up - Clinic] : a clinic follow-up of [Hypertension] : hypertension [Medication Management] : Medication management [FreeTextEntry1] : 79 year old anxious   with hx of hyperlipidemia and mild hypertension presents for evaluation after recent trip and fall without head trauma or LOC.  Also has c/o chronic left ear discomfort and wears hearing aides. \par \par These days she continues to gain  weight  and has some  dyspnea on exertion.      She is anxious and depressed but not suicidal. \par \par Has documented  moderate mitral regurgitation \par \par She wears hearing aides \par \par \par  [Mitral Regurgitation] : mitral regurgitation

## 2019-08-04 NOTE — REVIEW OF SYSTEMS
[Recent Weight Gain (___ Lbs)] : no recent weight gain [Recent Weight Loss (___ Lbs)] : recent [unfilled] ~Ulb weight loss [Loss Of Hearing] : hearing loss [Shortness Of Breath] : no shortness of breath [Palpitations] : no palpitations [Dysuria] : no dysuria [Incontinence] : no incontinence [Pelvic Pain] : no pelvic pain [Dysmenorrhea] : no dysmenorrhea [Vaginal Discharge] : no vaginal discharge [Depression] : depression [Abn Vaginal Bleeding] : no unexplained vaginal bleeding [Anxiety] : anxiety [Suicidal] : not suicidal [Negative] : Heme/Lymph

## 2019-08-04 NOTE — HISTORY OF PRESENT ILLNESS
[FreeTextEntry1] : EKG shows  NSR 85 bpm without ectopy, ischemia or LVH. \par \par She denies headaches, palpitations, syncope, dysphagia, rectal bleeding or hematuria \par \par Requests referral to ENT \par \par She is due for stress echo  but after recent fall and leg bruises , she needs to recover first\par \par

## 2019-08-04 NOTE — PHYSICAL EXAM
[General Appearance - Well Developed] : well developed [Normal Appearance] : normal appearance [Well Groomed] : well groomed [General Appearance - Well Nourished] : well nourished [No Deformities] : no deformities [General Appearance - In No Acute Distress] : no acute distress [Eyelids - No Xanthelasma] : the eyelids demonstrated no xanthelasmas [Normal Conjunctiva] : the conjunctiva exhibited no abnormalities [No Oral Cyanosis] : no oral cyanosis [Normal Jugular Venous A Waves Present] : normal jugular venous A waves present [Normal Jugular Venous V Waves Present] : normal jugular venous V waves present [No Jugular Venous Amaro A Waves] : no jugular venous amaro A waves [Respiration, Rhythm And Depth] : normal respiratory rhythm and effort [Exaggerated Use Of Accessory Muscles For Inspiration] : no accessory muscle use [Auscultation Breath Sounds / Voice Sounds] : lungs were clear to auscultation bilaterally [Heart Rate And Rhythm] : heart rate and rhythm were normal [Heart Sounds] : normal S1 and S2 [Arterial Pulses Normal] : the arterial pulses were normal [Edema] : no peripheral edema present [Systolic grade ___/6] : A grade [unfilled]/6 systolic murmur was heard. [FreeTextEntry1] : normal capillary refill [Abnormal Walk] : normal gait [Abdomen Mass (___ Cm)] : no abdominal mass palpated [Gait - Sufficient For Exercise Testing] : the gait was sufficient for exercise testing [Nail Clubbing] : no clubbing of the fingernails [Cyanosis, Localized] : no localized cyanosis [Skin Color & Pigmentation] : normal skin color and pigmentation [Skin Turgor] : normal skin turgor [] : no rash [Oriented To Time, Place, And Person] : oriented to person, place, and time [Mood] : the mood was normal

## 2019-08-11 ENCOUNTER — RX RENEWAL (OUTPATIENT)
Age: 79
End: 2019-08-11

## 2019-08-19 ENCOUNTER — RX RENEWAL (OUTPATIENT)
Age: 79
End: 2019-08-19

## 2019-09-16 ENCOUNTER — RX RENEWAL (OUTPATIENT)
Age: 79
End: 2019-09-16

## 2019-09-20 ENCOUNTER — APPOINTMENT (OUTPATIENT)
Dept: HEART AND VASCULAR | Facility: CLINIC | Age: 79
End: 2019-09-20
Payer: MEDICARE

## 2019-09-20 VITALS
DIASTOLIC BLOOD PRESSURE: 80 MMHG | TEMPERATURE: 97.2 F | RESPIRATION RATE: 14 BRPM | OXYGEN SATURATION: 94 % | HEART RATE: 83 BPM | SYSTOLIC BLOOD PRESSURE: 134 MMHG | BODY MASS INDEX: 25.87 KG/M2 | WEIGHT: 146 LBS | HEIGHT: 63 IN

## 2019-09-20 DIAGNOSIS — I05.9 RHEUMATIC MITRAL VALVE DISEASE, UNSPECIFIED: ICD-10-CM

## 2019-09-20 DIAGNOSIS — H92.02 OTALGIA, LEFT EAR: ICD-10-CM

## 2019-09-20 PROCEDURE — 93306 TTE W/DOPPLER COMPLETE: CPT

## 2019-09-20 PROCEDURE — 99214 OFFICE O/P EST MOD 30 MIN: CPT

## 2019-09-29 PROBLEM — H92.02 DISCOMFORT OF LEFT EAR: Status: ACTIVE | Noted: 2019-09-29

## 2019-09-29 NOTE — ASSESSMENT
[FreeTextEntry1] : Controlled HTN\par \par moderately severe MR \par \par left ear discomfort with hearing loss\par \par

## 2019-09-29 NOTE — REVIEW OF SYSTEMS
[Recent Weight Gain (___ Lbs)] : no recent weight gain [Recent Weight Loss (___ Lbs)] : recent [unfilled] ~Ulb weight loss [Loss Of Hearing] : hearing loss [Palpitations] : no palpitations [Shortness Of Breath] : no shortness of breath [Incontinence] : no incontinence [Dysuria] : no dysuria [Dysmenorrhea] : no dysmenorrhea [Pelvic Pain] : no pelvic pain [Vaginal Discharge] : no vaginal discharge [Abn Vaginal Bleeding] : no unexplained vaginal bleeding [Anxiety] : anxiety [Depression] : depression [Suicidal] : not suicidal [Negative] : Heme/Lymph

## 2019-09-29 NOTE — HISTORY OF PRESENT ILLNESS
[FreeTextEntry1] : No syncope, orthopnea, angina or significant palpitations\par \par \par Problems with her left ear - constant fullness without pain, or tinnitus. Wears hearing aides \par \par Last stress test April 2018

## 2019-09-29 NOTE — DISCUSSION/SUMMARY
[Essential Hypertension] : essential hypertension [Stable] : stable [Moderate Mitral Regurgitation] : moderate mitral regurgitation [Responding to Treatment] : responding to treatment [None] : none [Severe Mitral Regurgitation] : severe mitral regurgitation [Compensated] : compensated [Outpatient Evaluation] : outpatient evaluation [Stress Echocardiogram] : stress echocardiogram [BNP] : B-type natriuretic peptide [___ Month(s)] : [unfilled] month(s) [With Me] : with me [FreeTextEntry1] : Echocardiogram results reviewed with patient : declining LVEF with moderately severe MR  warrants stress testing and possible referral for  MV repair \par \par Return for High dose flu vaccine \par \par Advised follow up with ENT specialist  to assess left ear discomfort and hearing loss \par \par

## 2019-09-29 NOTE — PHYSICAL EXAM
[General Appearance - Well Developed] : well developed [Normal Appearance] : normal appearance [Well Groomed] : well groomed [General Appearance - Well Nourished] : well nourished [General Appearance - In No Acute Distress] : no acute distress [No Deformities] : no deformities [Eyelids - No Xanthelasma] : the eyelids demonstrated no xanthelasmas [Normal Conjunctiva] : the conjunctiva exhibited no abnormalities [No Oral Cyanosis] : no oral cyanosis [Normal Jugular Venous V Waves Present] : normal jugular venous V waves present [Normal Jugular Venous A Waves Present] : normal jugular venous A waves present [No Jugular Venous Amaro A Waves] : no jugular venous amaro A waves [Auscultation Breath Sounds / Voice Sounds] : lungs were clear to auscultation bilaterally [Exaggerated Use Of Accessory Muscles For Inspiration] : no accessory muscle use [Respiration, Rhythm And Depth] : normal respiratory rhythm and effort [Heart Sounds] : normal S1 and S2 [Heart Rate And Rhythm] : heart rate and rhythm were normal [Arterial Pulses Normal] : the arterial pulses were normal [Edema] : no peripheral edema present [FreeTextEntry1] : normal capillary refill [Systolic grade ___/6] : A grade [unfilled]/6 systolic murmur was heard. [Abnormal Walk] : normal gait [Gait - Sufficient For Exercise Testing] : the gait was sufficient for exercise testing [Cyanosis, Localized] : no localized cyanosis [Nail Clubbing] : no clubbing of the fingernails [Petechial Hemorrhages (___cm)] : no petechial hemorrhages [Skin Color & Pigmentation] : normal skin color and pigmentation [Skin Turgor] : normal skin turgor [] : no rash [Oriented To Time, Place, And Person] : oriented to person, place, and time [Mood] : the mood was normal

## 2019-09-29 NOTE — REASON FOR VISIT
[Follow-Up - Clinic] : a clinic follow-up of [Hypertension] : hypertension [Mitral Regurgitation] : mitral regurgitation [FreeTextEntry1] : 79 year old anxious   with hx of hyperlipidemia and mild hypertension presents for follow up echocardiogram to assess status of mitral regurgitation. \par \par These days she continues to gain  weight  and has some  dyspnea on exertion.      She is anxious and depressed but not suicidal. \par \par Has documented  moderately severe mitral regurgitation \par \par She wears hearing aides \par \par \par

## 2019-10-14 ENCOUNTER — RX RENEWAL (OUTPATIENT)
Age: 79
End: 2019-10-14

## 2019-10-17 ENCOUNTER — APPOINTMENT (OUTPATIENT)
Dept: HEART AND VASCULAR | Facility: CLINIC | Age: 79
End: 2019-10-17

## 2019-10-17 ENCOUNTER — INPATIENT (INPATIENT)
Facility: HOSPITAL | Age: 79
LOS: 4 days | Discharge: HOME | DRG: 689 | End: 2019-10-22
Attending: INTERNAL MEDICINE | Admitting: INTERNAL MEDICINE
Payer: MEDICARE

## 2019-10-17 VITALS
OXYGEN SATURATION: 97 % | HEART RATE: 86 BPM | RESPIRATION RATE: 18 BRPM | TEMPERATURE: 98 F | DIASTOLIC BLOOD PRESSURE: 63 MMHG | SYSTOLIC BLOOD PRESSURE: 134 MMHG

## 2019-10-17 DIAGNOSIS — N17.9 ACUTE KIDNEY FAILURE, UNSPECIFIED: ICD-10-CM

## 2019-10-17 DIAGNOSIS — R63.8 OTHER SYMPTOMS AND SIGNS CONCERNING FOOD AND FLUID INTAKE: ICD-10-CM

## 2019-10-17 DIAGNOSIS — G92 TOXIC ENCEPHALOPATHY: ICD-10-CM

## 2019-10-17 DIAGNOSIS — F41.9 ANXIETY DISORDER, UNSPECIFIED: ICD-10-CM

## 2019-10-17 DIAGNOSIS — N39.0 URINARY TRACT INFECTION, SITE NOT SPECIFIED: ICD-10-CM

## 2019-10-17 DIAGNOSIS — F01.50 VASCULAR DEMENTIA WITHOUT BEHAVIORAL DISTURBANCE: ICD-10-CM

## 2019-10-17 DIAGNOSIS — Z86.59 PERSONAL HISTORY OF OTHER MENTAL AND BEHAVIORAL DISORDERS: ICD-10-CM

## 2019-10-17 DIAGNOSIS — Z91.89 OTHER SPECIFIED PERSONAL RISK FACTORS, NOT ELSEWHERE CLASSIFIED: ICD-10-CM

## 2019-10-17 DIAGNOSIS — E78.00 PURE HYPERCHOLESTEROLEMIA, UNSPECIFIED: ICD-10-CM

## 2019-10-17 LAB
ALBUMIN SERPL ELPH-MCNC: 4.4 G/DL — SIGNIFICANT CHANGE UP (ref 3.3–5)
ALP SERPL-CCNC: 66 U/L — SIGNIFICANT CHANGE UP (ref 40–120)
ALT FLD-CCNC: 16 U/L — SIGNIFICANT CHANGE UP (ref 10–45)
ANION GAP SERPL CALC-SCNC: 15 MMOL/L — SIGNIFICANT CHANGE UP (ref 5–17)
APPEARANCE UR: ABNORMAL
APTT BLD: 25.5 SEC — LOW (ref 27.5–36.3)
AST SERPL-CCNC: 28 U/L — SIGNIFICANT CHANGE UP (ref 10–40)
BASOPHILS # BLD AUTO: 0.09 K/UL — SIGNIFICANT CHANGE UP (ref 0–0.2)
BASOPHILS NFR BLD AUTO: 0.9 % — SIGNIFICANT CHANGE UP (ref 0–2)
BILIRUB SERPL-MCNC: 0.9 MG/DL — SIGNIFICANT CHANGE UP (ref 0.2–1.2)
BILIRUB UR-MCNC: NEGATIVE — SIGNIFICANT CHANGE UP
BUN SERPL-MCNC: 42 MG/DL — HIGH (ref 7–23)
CALCIUM SERPL-MCNC: 10 MG/DL — SIGNIFICANT CHANGE UP (ref 8.4–10.5)
CHLORIDE SERPL-SCNC: 102 MMOL/L — SIGNIFICANT CHANGE UP (ref 96–108)
CO2 SERPL-SCNC: 21 MMOL/L — LOW (ref 22–31)
COLOR SPEC: YELLOW — SIGNIFICANT CHANGE UP
CREAT SERPL-MCNC: 1.51 MG/DL — HIGH (ref 0.5–1.3)
DIFF PNL FLD: NEGATIVE — SIGNIFICANT CHANGE UP
EOSINOPHIL # BLD AUTO: 0.11 K/UL — SIGNIFICANT CHANGE UP (ref 0–0.5)
EOSINOPHIL NFR BLD AUTO: 1.1 % — SIGNIFICANT CHANGE UP (ref 0–6)
ETHANOL SERPL-MCNC: <10 MG/DL — SIGNIFICANT CHANGE UP (ref 0–10)
FOLATE SERPL-MCNC: >20 NG/ML — SIGNIFICANT CHANGE UP
GLUCOSE SERPL-MCNC: 102 MG/DL — HIGH (ref 70–99)
GLUCOSE UR QL: NEGATIVE — SIGNIFICANT CHANGE UP
HCT VFR BLD CALC: 35.4 % — SIGNIFICANT CHANGE UP (ref 34.5–45)
HGB BLD-MCNC: 11.7 G/DL — SIGNIFICANT CHANGE UP (ref 11.5–15.5)
IMM GRANULOCYTES NFR BLD AUTO: 0.4 % — SIGNIFICANT CHANGE UP (ref 0–1.5)
INR BLD: 1.01 — SIGNIFICANT CHANGE UP (ref 0.88–1.16)
KETONES UR-MCNC: 15 MG/DL
LEUKOCYTE ESTERASE UR-ACNC: ABNORMAL
LYMPHOCYTES # BLD AUTO: 1.05 K/UL — SIGNIFICANT CHANGE UP (ref 1–3.3)
LYMPHOCYTES # BLD AUTO: 10.8 % — LOW (ref 13–44)
MAGNESIUM SERPL-MCNC: 2.3 MG/DL — SIGNIFICANT CHANGE UP (ref 1.6–2.6)
MCHC RBC-ENTMCNC: 29.7 PG — SIGNIFICANT CHANGE UP (ref 27–34)
MCHC RBC-ENTMCNC: 33.1 GM/DL — SIGNIFICANT CHANGE UP (ref 32–36)
MCV RBC AUTO: 89.8 FL — SIGNIFICANT CHANGE UP (ref 80–100)
MONOCYTES # BLD AUTO: 0.87 K/UL — SIGNIFICANT CHANGE UP (ref 0–0.9)
MONOCYTES NFR BLD AUTO: 9 % — SIGNIFICANT CHANGE UP (ref 2–14)
NEUTROPHILS # BLD AUTO: 7.52 K/UL — HIGH (ref 1.8–7.4)
NEUTROPHILS NFR BLD AUTO: 77.8 % — HIGH (ref 43–77)
NITRITE UR-MCNC: POSITIVE
NRBC # BLD: 0 /100 WBCS — SIGNIFICANT CHANGE UP (ref 0–0)
PCP SPEC-MCNC: SIGNIFICANT CHANGE UP
PH UR: 6 — SIGNIFICANT CHANGE UP (ref 5–8)
PLATELET # BLD AUTO: 204 K/UL — SIGNIFICANT CHANGE UP (ref 150–400)
POTASSIUM SERPL-MCNC: 4.4 MMOL/L — SIGNIFICANT CHANGE UP (ref 3.5–5.3)
POTASSIUM SERPL-SCNC: 4.4 MMOL/L — SIGNIFICANT CHANGE UP (ref 3.5–5.3)
PROT SERPL-MCNC: 7.2 G/DL — SIGNIFICANT CHANGE UP (ref 6–8.3)
PROT UR-MCNC: 100 MG/DL
PROTHROM AB SERPL-ACNC: 11.4 SEC — SIGNIFICANT CHANGE UP (ref 10–12.9)
RBC # BLD: 3.94 M/UL — SIGNIFICANT CHANGE UP (ref 3.8–5.2)
RBC # FLD: 13.5 % — SIGNIFICANT CHANGE UP (ref 10.3–14.5)
SODIUM SERPL-SCNC: 138 MMOL/L — SIGNIFICANT CHANGE UP (ref 135–145)
SP GR SPEC: >=1.03 — SIGNIFICANT CHANGE UP (ref 1–1.03)
T PALLIDUM AB TITR SER: NEGATIVE — SIGNIFICANT CHANGE UP
TROPONIN T SERPL-MCNC: <0.01 NG/ML — SIGNIFICANT CHANGE UP (ref 0–0.01)
TSH SERPL-MCNC: 2.38 UIU/ML — SIGNIFICANT CHANGE UP (ref 0.35–4.94)
UROBILINOGEN FLD QL: 1 E.U./DL — SIGNIFICANT CHANGE UP
VIT B12 SERPL-MCNC: 838 PG/ML — SIGNIFICANT CHANGE UP (ref 232–1245)
WBC # BLD: 9.68 K/UL — SIGNIFICANT CHANGE UP (ref 3.8–10.5)
WBC # FLD AUTO: 9.68 K/UL — SIGNIFICANT CHANGE UP (ref 3.8–10.5)

## 2019-10-17 PROCEDURE — 99223 1ST HOSP IP/OBS HIGH 75: CPT

## 2019-10-17 PROCEDURE — 70450 CT HEAD/BRAIN W/O DYE: CPT | Mod: 26

## 2019-10-17 PROCEDURE — 93010 ELECTROCARDIOGRAM REPORT: CPT

## 2019-10-17 PROCEDURE — 99285 EMERGENCY DEPT VISIT HI MDM: CPT | Mod: 25

## 2019-10-17 PROCEDURE — 71045 X-RAY EXAM CHEST 1 VIEW: CPT | Mod: 26

## 2019-10-17 PROCEDURE — 99223 1ST HOSP IP/OBS HIGH 75: CPT | Mod: GC

## 2019-10-17 RX ORDER — ESCITALOPRAM OXALATE 10 MG/1
1 TABLET, FILM COATED ORAL
Qty: 0 | Refills: 0 | DISCHARGE

## 2019-10-17 RX ORDER — HALOPERIDOL DECANOATE 100 MG/ML
1 INJECTION INTRAMUSCULAR EVERY 4 HOURS
Refills: 0 | Status: DISCONTINUED | OUTPATIENT
Start: 2019-10-17 | End: 2019-10-18

## 2019-10-17 RX ORDER — METOPROLOL TARTRATE 50 MG
25 TABLET ORAL DAILY
Refills: 0 | Status: DISCONTINUED | OUTPATIENT
Start: 2019-10-18 | End: 2019-10-22

## 2019-10-17 RX ORDER — ALPRAZOLAM 0.25 MG
0.5 TABLET ORAL
Refills: 0 | Status: DISCONTINUED | OUTPATIENT
Start: 2019-10-17 | End: 2019-10-18

## 2019-10-17 RX ORDER — THIAMINE MONONITRATE (VIT B1) 100 MG
100 TABLET ORAL EVERY 24 HOURS
Refills: 0 | Status: DISCONTINUED | OUTPATIENT
Start: 2019-10-17 | End: 2019-10-22

## 2019-10-17 RX ORDER — ESCITALOPRAM OXALATE 10 MG/1
0 TABLET, FILM COATED ORAL
Qty: 0 | Refills: 0 | DISCHARGE

## 2019-10-17 RX ORDER — CEFTRIAXONE 500 MG/1
1000 INJECTION, POWDER, FOR SOLUTION INTRAMUSCULAR; INTRAVENOUS EVERY 24 HOURS
Refills: 0 | Status: DISCONTINUED | OUTPATIENT
Start: 2019-10-18 | End: 2019-10-21

## 2019-10-17 RX ORDER — ATORVASTATIN CALCIUM 80 MG/1
10 TABLET, FILM COATED ORAL AT BEDTIME
Refills: 0 | Status: DISCONTINUED | OUTPATIENT
Start: 2019-10-17 | End: 2019-10-22

## 2019-10-17 RX ORDER — LOSARTAN POTASSIUM 100 MG/1
1 TABLET, FILM COATED ORAL
Qty: 0 | Refills: 0 | DISCHARGE

## 2019-10-17 RX ORDER — ASPIRIN/CALCIUM CARB/MAGNESIUM 324 MG
0 TABLET ORAL
Qty: 0 | Refills: 0 | DISCHARGE

## 2019-10-17 RX ORDER — ESCITALOPRAM OXALATE 10 MG/1
10 TABLET, FILM COATED ORAL DAILY
Refills: 0 | Status: DISCONTINUED | OUTPATIENT
Start: 2019-10-17 | End: 2019-10-22

## 2019-10-17 RX ORDER — ALPRAZOLAM 0.25 MG
0 TABLET ORAL
Qty: 0 | Refills: 0 | DISCHARGE

## 2019-10-17 RX ORDER — METOPROLOL TARTRATE 50 MG
1 TABLET ORAL
Qty: 0 | Refills: 0 | DISCHARGE

## 2019-10-17 RX ORDER — SODIUM CHLORIDE 9 MG/ML
1000 INJECTION INTRAMUSCULAR; INTRAVENOUS; SUBCUTANEOUS
Refills: 0 | Status: DISCONTINUED | OUTPATIENT
Start: 2019-10-17 | End: 2019-10-22

## 2019-10-17 RX ORDER — HALOPERIDOL DECANOATE 100 MG/ML
1 INJECTION INTRAMUSCULAR ONCE
Refills: 0 | Status: COMPLETED | OUTPATIENT
Start: 2019-10-17 | End: 2019-10-17

## 2019-10-17 RX ORDER — CEFTRIAXONE 500 MG/1
1000 INJECTION, POWDER, FOR SOLUTION INTRAMUSCULAR; INTRAVENOUS ONCE
Refills: 0 | Status: COMPLETED | OUTPATIENT
Start: 2019-10-17 | End: 2019-10-17

## 2019-10-17 RX ADMIN — ESCITALOPRAM OXALATE 10 MILLIGRAM(S): 10 TABLET, FILM COATED ORAL at 17:55

## 2019-10-17 RX ADMIN — Medication 0.5 MILLIGRAM(S): at 20:43

## 2019-10-17 RX ADMIN — HALOPERIDOL DECANOATE 1 MILLIGRAM(S): 100 INJECTION INTRAMUSCULAR at 05:55

## 2019-10-17 RX ADMIN — Medication 1 MILLIGRAM(S): at 06:06

## 2019-10-17 RX ADMIN — Medication 100 MILLIGRAM(S): at 14:31

## 2019-10-17 RX ADMIN — CEFTRIAXONE 100 MILLIGRAM(S): 500 INJECTION, POWDER, FOR SOLUTION INTRAMUSCULAR; INTRAVENOUS at 03:40

## 2019-10-17 RX ADMIN — CEFTRIAXONE 1000 MILLIGRAM(S): 500 INJECTION, POWDER, FOR SOLUTION INTRAMUSCULAR; INTRAVENOUS at 04:10

## 2019-10-17 RX ADMIN — HALOPERIDOL DECANOATE 1 MILLIGRAM(S): 100 INJECTION INTRAMUSCULAR at 19:26

## 2019-10-17 NOTE — ED ADULT TRIAGE NOTE - CHIEF COMPLAINT QUOTE
daughter called EMS from [facility] because pt has been increasingly confused and disoriented; last known well is over 48 hrs ago when another family member spoke to pt; I called daughter in [place] at time of triage to confirm this; pt report left ear fullness and hearing music playing for over 1 month; denies SI/HI/VH/voices telling her to harm herself or others; daughter Ana Carlisle [phone] in [place]

## 2019-10-17 NOTE — H&P ADULT - PROBLEM SELECTOR PLAN 4
- Patient reports being on Lexapro and Xanax at home for both anxiety and depression. - Patient reports occasional anxiety and uses Xanax (unknown dose) every other day.   - Med Rec required.

## 2019-10-17 NOTE — H&P ADULT - PROBLEM SELECTOR PLAN 3
- Patient presenting with elevated BUN/Cr - 42/1.51 - w/ an unknown baseline  - Patient reporting decreased PO intake of late, which is supported by ketonuria and elevated SG on UA, 1.030.   - Suspect, therefore, a pre-renal etiology for current EDI  - F/u urine lytes to confirm etiology   - C/w gentle IVF hydration for 8 hours.

## 2019-10-17 NOTE — H&P ADULT - PROBLEM SELECTOR PLAN 6
F:   E: Replete PRN  N: Regular  Prophylaxis: Lovenox  Dispo: [facility] 1) PCP Contacted on Admission: (Y/N) --> Name & Phone #: Jossue Hardin   2) Date of Contact with PCP:  3) PCP Contacted at Discharge: (Y/N)  4) Summary of Handoff Given to PCP:   5) Post-Discharge Appointment Date and Location:

## 2019-10-17 NOTE — PHYSICAL THERAPY INITIAL EVALUATION ADULT - MODALITIES TREATMENT COMMENTS
CN II - XII grossly intact, visual fields grossly WNL in all quadrants, normal visual tracking no nystagmus noted

## 2019-10-17 NOTE — CONSULT NOTE ADULT - SUBJECTIVE AND OBJECTIVE BOX
Patient is a 79y old  Female who presents with a chief complaint of AMS, UTI (17 Oct 2019 03:53)       HPI:  78yo Female, PMHx HLD - presenting on insistence of daughter who called EMS for progressive confusion and hallucinations.     Patient's history is tangential and speech is seemingly pressured - however, she reports that today her daughter was increasingly concerned about her given periods of confusion and reported auditory hallucinations. The patient herself reports fatigue all of yesterday and called her Cardiologist/PCP Dr Jossue Hardin who she was meant to see and he discussed a possible UTI and need for antibiotics. She denies fevers, chills, sweats. No dysuria, but she reports urinary frequency. No suprapubic pain, no nausea/vomiting. Patient does endorse decreased PO intake, but increased ingestion of ice pops. With regards to auditory hallucinations - she has been hearing music when none is playing and her name being called out. Symptoms are on the L ear. She denies tinnitus, but did note some pressure intermittently. She reported a headache yesterday, temporal, but could not recall prior episodes of headaches.     In the ED, vitals as follows: T: Afebrile | HR: 82-86bpm | BP: 130-134/63-66mmHg | RR: 17-18/min | SpO2: 97-98% RA   Labs significant for: No Leukocytosis, Bicarbonate 21, BUN/Cr 42/1.51, UA [(+) ketones, SG 1.030, (+) Nitrites, Trace LE, Many WBC], Utox (+) Benzos, Neg HAYDER  Imaging:  CTH:  No hydrocephalus, midline shift, acute intracranial hemorrhage or   demarcated territorial infarct.  EKG: NSR, intervals wnl - q wave aVl, t wave flattening V6 - non-specific.     Patient was administered: CTX 1g (17 Oct 2019 03:53)      PAST MEDICAL & SURGICAL HISTORY:  High cholesterol  Anxiety  Other postprocedural status: breast implants, face lifts      MEDICATIONS  (STANDING):  sodium chloride 0.9%. 1000 milliLiter(s) (90 mL/Hr) IV Continuous <Continuous>    MEDICATIONS  (PRN):  haloperidol    Injectable 1 milliGRAM(s) IntraMuscular every 4 hours PRN Agitation      FAMILY HISTORY:      CBC Full  -  ( 17 Oct 2019 02:13 )  WBC Count : 9.68 K/uL  RBC Count : 3.94 M/uL  Hemoglobin : 11.7 g/dL  Hematocrit : 35.4 %  Platelet Count - Automated : 204 K/uL  Mean Cell Volume : 89.8 fl  Mean Cell Hemoglobin : 29.7 pg  Mean Cell Hemoglobin Concentration : 33.1 gm/dL  Auto Neutrophil # : 7.52 K/uL  Auto Lymphocyte # : 1.05 K/uL  Auto Monocyte # : 0.87 K/uL  Auto Eosinophil # : 0.11 K/uL  Auto Basophil # : 0.09 K/uL  Auto Neutrophil % : 77.8 %  Auto Lymphocyte % : 10.8 %  Auto Monocyte % : 9.0 %  Auto Eosinophil % : 1.1 %  Auto Basophil % : 0.9 %      10-17    138  |  102  |  42<H>  ----------------------------<  102<H>  4.4   |  21<L>  |  1.51<H>    Ca    10.0      17 Oct 2019 02:13  Mg     2.3     10-17    TPro  7.2  /  Alb  4.4  /  TBili  0.9  /  DBili  x   /  AST  28  /  ALT  16  /  AlkPhos  66  10-17      Urinalysis Basic - ( 17 Oct 2019 03:00 )    Color: Yellow / Appearance: SL Cloudy / SG: >=1.030 / pH: x  Gluc: x / Ketone: 15 mg/dL  / Bili: Negative / Urobili: 1.0 E.U./dL   Blood: x / Protein: 100 mg/dL / Nitrite: POSITIVE   Leuk Esterase: Trace / RBC: < 5 /HPF / WBC Many /HPF   Sq Epi: x / Non Sq Epi: 0-5 /HPF / Bacteria: Present /HPF          Radiology:    < from: CT Head No Cont (10.17.19 @ 02:25) >    EXAM:  CT BRAIN                          PROCEDURE DATE:  10/17/2019          INTERPRETATION:  CT OF THE HEAD WITHOUT CONTRAST    INDICATION:  ams    TECHNIQUE: An axial noncontrast CT scan of the head was performed.   Sagittal and coronal reformatted images were also obtained.    CONTRAST:  None    COMPARISON:  1/29/2017    FINDINGS:  There is ventricular and sulcal prominence consistent with generalized   age related cerebral volume loss. There is no hydrocephalus. The basal   cisterns are patent. There is no focal mass effect or midline shift.   There are no extra-axial collections or intraparenchymal hemorrhage.    There are scattered regions of hypodensity involving the periventricular   and subcortical white matter consistent with chronic microvascular   ischemic changes. 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 There is no evidence of acute transcortical territorial   infarction.    The patient has had prior bilateral ocular lens replacement.     The mastoids and paranasal sinuses are well aerated. The calvaria is   intact.    IMPRESSION:  No hydrocephalus, midline shift, acute intracranial hemorrhage or   demarcated territorial infarct.          Vital Signs Last 24 Hrs  T(C): 36.5 (17 Oct 2019 05:02), Max: 36.8 (17 Oct 2019 03:45)  T(F): 97.7 (17 Oct 2019 05:02), Max: 98.3 (17 Oct 2019 03:45)  HR: 80 (17 Oct 2019 05:02) (80 - 86)  BP: 150/98 (17 Oct 2019 05:02) (130/66 - 150/98)  BP(mean): --  RR: 18 (17 Oct 2019 05:02) (17 - 18)  SpO2: 99% (17 Oct 2019 05:02) (97% - 99%)    REVIEW OF SYSTEMS:    CONSTITUTIONAL:  fatigue  EYES: No eye pain, visual disturbances, or discharge  ENMT:  No difficulty hearing, tinnitus, vertigo; No sinus or throat pain  NECK: No pain or stiffness  BREASTS: No pain, masses, or nipple discharge  RESPIRATORY: No cough, wheezing, chills or hemoptysis; No shortness of breath  CARDIOVASCULAR: No chest pain, palpitations, dizziness, or leg swelling  GASTROINTESTINAL: No abdominal or epigastric pain. No nausea, vomiting, or hematemesis; No diarrhea or constipation. No melena or hematochezia.  GENITOURINARY: No dysuria, frequency, hematuria, or incontinence  NEUROLOGICAL: No headaches, memory loss, loss of strength, numbness, or tremors  SKIN: No itching, burning, rashes, or lesions   LYMPH NODES: No enlarged glands  ENDOCRINE: No heat or cold intolerance; No hair loss  MUSCULOSKELETAL: No joint pain or swelling; No muscle, back, or extremity pain  PSYCHIATRIC: No depression, anxiety, mood swings, or difficulty sleeping  HEME/LYMPH: No easy bruising, or bleeding gums  ALLERGY AND IMMUNOLOGIC: No hives or eczema  VASCULAR: no swelling, erythema      Physical Exam: 78 yo  woman lying in semi Gonzales's position, c/o feeling tired, no other complaints    Head: normocephalic, atraumatic    Eyes: PERRLA, EOMI, no nystagmus, sclera anicteric    ENT: nasal discharge, uvula midline, no oropharyngeal erythema/exudate    Neck: supple, negative JVD, negative carotid bruits, no thyromegaly    Chest: CTA bilaterally, neg wheeze/ rhonchi/ rales/ crackles/ egophany    Cardiovascular: regular rate and rhythm, neg murmurs/rubs/gallops    Abdomen: soft, non distended, non tender, negative rebound/guarding, normal bowel sounds, neg hepatosplenomegaly    Extremities: WWP, neg cyanosis/clubbing/edema, negative calf tenderness to palpation, negative Colby's sign    :     Neurologic Exam:    Alert and oriented x 2 to person, place, speech fluent w/o dysarthria, recent and remote memory intact, repetition intact, comprehension intact    Cranial Nerves:     II:                       R pupil contricted, L dilated, visual fields intact   III/ IV/VI:             extraocular movements intact, neg nystagmus, neg ptosis  V:                       facial sensation intact, V1-3 normal  VII:                     face symmetric, no droop, normal eye closure and smile  VIII:                    hearing intact to finger rub bilaterally  IX/ X:                 soft palate rise symmetrical  XI:                      head turning, shoulder shrug normal  XII:                     tongue midline    Motor Exam:    Upper Extremities:     RIght:   no focal weakness               negative drift    Left :   no focal weakness               negative drift    Lower Extremities:                 Right:   no focal weakness                 Left:      no focal weakness                   Sensory:    intact to LT/PP in all UE/LE dermatomes    DTR:            = biceps/     triceps/     brachioradialis                      = patella/   medial hamstring/ankle                      neg clonus                      neg Babinski                        Finger to Nose:  wnl    Heel to Shin:  wnl    Rapid Alternating movements:  wnl    Joint Position Sense:  intact    Romberg:  not tested    Tandem Walking:  not tested    Gait:  not tested        PM&R Impression:    1) deconditioned  2) UTI/ TME        Recommendations:    1) Physical therapy focusing on therapeutic exercises, bed mobility/transfer out of bed evaluation, progressive ambulation with assistive devices prn.    2) Current Disposition Plan/Recs: pending functional progress

## 2019-10-17 NOTE — BEHAVIORAL HEALTH ASSESSMENT NOTE - NSBHCHARTREVIEWLAB_PSY_A_CORE FT
11.7   9.68  )-----------( 204      ( 17 Oct 2019 02:13 )             35.4     10-17    138  |  102  |  42<H>  ----------------------------<  102<H>  4.4   |  21<L>  |  1.51<H>    Ca    10.0      17 Oct 2019 02:13  Mg     2.3     10-17    TPro  7.2  /  Alb  4.4  /  TBili  0.9  /  DBili  x   /  AST  28  /  ALT  16  /  AlkPhos  66  10-17    Thyroid Stimulating Hormone, Serum: 2.385 uIU/mL (10.17.19 @ 02:13)  Vitamin B12, Serum: 838 pg/mL (10.17.19 @ 11:20)  Urine Microscopic-Add On (NC) (10.17.19 @ 03:00)    Bacteria: Present /HPF    Epithelial Cells: 0-5: Occasional: <3 /HPF  Few: 3-10 /HPF  Mod: 10-30 /HPF  Many: >30 /HPF /HPF    Red Blood Cell - Urine: < 5 /HPF    White Blood Cell - Urine: Many /HPF    Hyaline Casts: 0-2 /LPF    U Tox + benzos

## 2019-10-17 NOTE — ED ADULT NURSE NOTE - PAIN RATING/NUMBER SCALE (0-10): ACTIVITY
Appt scheduled for 10:00 am today.  
Mom is requesting an appt today with Dr. Garland.  She currently has an 10:45 am wait time held at the [facility] - mom requesting appt w/Dr. Garland ([phone]) - ok to leave voicemail if can be seen by Dr. Garland.      Symptoms: Not feeling well (fatigue & runny nose) since Saturday. Now has really heavy, faster breathing - mom can hear his congestion. Patient complains that his tummy, head, and feet hurt.  Has fever (not sure how high due to thermometer not working).   Not eating or drinking or as much as normal. Has had 2-3 wet diapers in last 12 hours.  Denies ears hurting.    Duration: 1 days    Similar to a past problem? No      Relieving factors: Got Ibuprofen at 0800 am today (which helped patient feel less whiney but didn't help with breathing issue).   Tried bringing patient in to bathroom to have more humid air - mom wasn't sure if it made a difference because patient layed on the floor and fell asleep.      
0

## 2019-10-17 NOTE — ED PROVIDER NOTE - OBJECTIVE STATEMENT
79F hx high chol, BIBEMS for altered mental status.  per EMS pt's daughter called EMS as pt with increasing confusion.  daughter states pt disoriented and seems increasingly paranoid.  pt states she has been having L ear fullness for past month and hearing music playing. states occasionally hears her name being called, however no one else does. no SI/HI. no vomiting, no fevers. no chest pain. no SOB.

## 2019-10-17 NOTE — PROGRESS NOTE ADULT - SUBJECTIVE AND OBJECTIVE BOX
OVERNIGHT EVENTS: Pt was agitated this morning. Needed 1 mg of haldol and 1 mg of ativan    SUBJECTIVE / INTERVAL HPI: Patient seen and examined at bedside. Spoke with pt's sister who was at bedside this morning. She reports that pt has been experiencing auditory hallucinations for the past 2 months. Pt states that she hears voices calling out her name "Jyotsna Blackburn" and hears music in the background. Pt sister reports that yesterday, while talking to her on the phone, pt reported that she was seeing her  in her apartment that had past away 5 years ago. Then later in the day, pt started having     VITAL SIGNS:  Vital Signs Last 24 Hrs  T(C): 36.5 (17 Oct 2019 05:02), Max: 36.8 (17 Oct 2019 03:45)  T(F): 97.7 (17 Oct 2019 05:02), Max: 98.3 (17 Oct 2019 03:45)  HR: 80 (17 Oct 2019 05:02) (80 - 86)  BP: 150/98 (17 Oct 2019 05:02) (130/66 - 150/98)  BP(mean): --  RR: 18 (17 Oct 2019 05:02) (17 - 18)  SpO2: 99% (17 Oct 2019 05:02) (97% - 99%)    PHYSICAL EXAM:    General: WDWN  HEENT: NC/AT; PERRL, anicteric sclera; MMM  Neck: supple  Cardiovascular: +S1/S2; RRR  Respiratory: CTA B/L; no W/R/R  Gastrointestinal: soft, NT/ND; +BSx4  Extremities: WWP; no edema, clubbing or cyanosis  Vascular: 2+ radial, DP/PT pulses B/L  Neurological: AAOx3; no focal deficits    MEDICATIONS:  MEDICATIONS  (STANDING):  sodium chloride 0.9%. 1000 milliLiter(s) (90 mL/Hr) IV Continuous <Continuous>    MEDICATIONS  (PRN):  haloperidol    Injectable 1 milliGRAM(s) IntraMuscular every 4 hours PRN Agitation      ALLERGIES:  Allergies    No Known Allergies    Intolerances        LABS:                        11.7   9.68  )-----------( 204      ( 17 Oct 2019 02:13 )             35.4     10-17    138  |  102  |  42<H>  ----------------------------<  102<H>  4.4   |  21<L>  |  1.51<H>    Ca    10.0      17 Oct 2019 02:13  Mg     2.3     10-17    TPro  7.2  /  Alb  4.4  /  TBili  0.9  /  DBili  x   /  AST  28  /  ALT  16  /  AlkPhos  66  10-17    PT/INR - ( 17 Oct 2019 02:13 )   PT: 11.4 sec;   INR: 1.01          PTT - ( 17 Oct 2019 02:13 )  PTT:25.5 sec  Urinalysis Basic - ( 17 Oct 2019 03:00 )    Color: Yellow / Appearance: SL Cloudy / SG: >=1.030 / pH: x  Gluc: x / Ketone: 15 mg/dL  / Bili: Negative / Urobili: 1.0 E.U./dL   Blood: x / Protein: 100 mg/dL / Nitrite: POSITIVE   Leuk Esterase: Trace / RBC: < 5 /HPF / WBC Many /HPF   Sq Epi: x / Non Sq Epi: 0-5 /HPF / Bacteria: Present /HPF      CAPILLARY BLOOD GLUCOSE      POCT Blood Glucose.: 111 mg/dL (17 Oct 2019 01:51)      RADIOLOGY & ADDITIONAL TESTS: Reviewed. OVERNIGHT EVENTS: Pt was agitated this morning. Needed 1 mg of haldol and 1 mg of ativan    SUBJECTIVE / INTERVAL HPI: Patient seen and examined at bedside. Spoke with pt's sister who was at bedside this morning. She reports that pt has been experiencing auditory hallucinations for the past 2 months with short term memory loss. Pt states that she hears voices calling out her name "Jyotsna Blackburn" and hears music in the background. Pt sister reports that yesterday, while talking to her on the phone, pt reported that she was seeing her  in her apartment that had passed away 5 years ago. Then later in the day, pt started having having hallucinations that she was in her apt in [place]. Pt's sister called EMS to bring pt to hospital.     VITAL SIGNS:  Vital Signs Last 24 Hrs  T(C): 36.5 (17 Oct 2019 05:02), Max: 36.8 (17 Oct 2019 03:45)  T(F): 97.7 (17 Oct 2019 05:02), Max: 98.3 (17 Oct 2019 03:45)  HR: 80 (17 Oct 2019 05:02) (80 - 86)  BP: 150/98 (17 Oct 2019 05:02) (130/66 - 150/98)  BP(mean): --  RR: 18 (17 Oct 2019 05:02) (17 - 18)  SpO2: 99% (17 Oct 2019 05:02) (97% - 99%)    PHYSICAL EXAM:    General: WDWN  HEENT: NC/AT; PERRL, anicteric sclera; MMM  Neck: supple  Cardiovascular: +S1/S2; RRR  Respiratory: CTA B/L; no W/R/R  Gastrointestinal: soft, NT/ND; +BSx4  Extremities: WWP; no edema, clubbing or cyanosis  Vascular: 2+ radial, DP/PT pulses B/L  Neurological: AAOx3; no focal deficits    MEDICATIONS:  MEDICATIONS  (STANDING):  sodium chloride 0.9%. 1000 milliLiter(s) (90 mL/Hr) IV Continuous <Continuous>    MEDICATIONS  (PRN):  haloperidol    Injectable 1 milliGRAM(s) IntraMuscular every 4 hours PRN Agitation      ALLERGIES:  Allergies    No Known Allergies    Intolerances        LABS:                        11.7   9.68  )-----------( 204      ( 17 Oct 2019 02:13 )             35.4     10-17    138  |  102  |  42<H>  ----------------------------<  102<H>  4.4   |  21<L>  |  1.51<H>    Ca    10.0      17 Oct 2019 02:13  Mg     2.3     10-17    TPro  7.2  /  Alb  4.4  /  TBili  0.9  /  DBili  x   /  AST  28  /  ALT  16  /  AlkPhos  66  10-17    PT/INR - ( 17 Oct 2019 02:13 )   PT: 11.4 sec;   INR: 1.01          PTT - ( 17 Oct 2019 02:13 )  PTT:25.5 sec  Urinalysis Basic - ( 17 Oct 2019 03:00 )    Color: Yellow / Appearance: SL Cloudy / SG: >=1.030 / pH: x  Gluc: x / Ketone: 15 mg/dL  / Bili: Negative / Urobili: 1.0 E.U./dL   Blood: x / Protein: 100 mg/dL / Nitrite: POSITIVE   Leuk Esterase: Trace / RBC: < 5 /HPF / WBC Many /HPF   Sq Epi: x / Non Sq Epi: 0-5 /HPF / Bacteria: Present /HPF      CAPILLARY BLOOD GLUCOSE      POCT Blood Glucose.: 111 mg/dL (17 Oct 2019 01:51)      RADIOLOGY & ADDITIONAL TESTS: Reviewed.

## 2019-10-17 NOTE — ED ADULT NURSE NOTE - NSIMPLEMENTINTERV_GEN_ALL_ED
Implemented All Fall Risk Interventions:  [place] to call system. Call bell, personal items and telephone within reach. Instruct patient to call for assistance. Room bathroom lighting operational. Non-slip footwear when patient is off stretcher. Physically safe environment: no spills, clutter or unnecessary equipment. Stretcher in lowest position, wheels locked, appropriate side rails in place. Provide visual cue, wrist band, yellow gown, etc. Monitor gait and stability. Monitor for mental status changes and reorient to person, place, and time. Review medications for side effects contributing to fall risk. Reinforce activity limits and safety measures with patient and family.

## 2019-10-17 NOTE — BEHAVIORAL HEALTH ASSESSMENT NOTE - NSBHCHARTREVIEWIMAGING_PSY_A_CORE FT
CT BRAIN                          PROCEDURE DATE:  10/17/2019          INTERPRETATION:  CT OF THE HEAD WITHOUT CONTRAST    INDICATION:  ams    MPRESSION:  No hydrocephalus, midline shift, acute intracranial hemorrhage or   demarcated territorial infarct.

## 2019-10-17 NOTE — BEHAVIORAL HEALTH ASSESSMENT NOTE - OTHER
Thinning eyebrows Not tested In bed Sl rapid but interruptible. Not pressured "Better" Sl elevated Pt denies auditory or visual hallucinations. Does report that after she hears music, she hears it later. 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 Reports of AMS x 1 month

## 2019-10-17 NOTE — ED PROVIDER NOTE - ENMT, MLM
Airway patent, Nasal mucosa clear. Mouth with normal mucosa. Throat has no vesicles, no oropharyngeal exudates and uvula is midline. TMs clear b/l, no canal inflammation, no pain to mastoid

## 2019-10-17 NOTE — ED ADULT NURSE NOTE - CHPI ED NUR SYMPTOMS NEG
no blurred vision/no dizziness/no fever/no weakness/no loss of consciousness/no change in level of consciousness/no nausea/no numbness/no vomiting

## 2019-10-17 NOTE — ED ADULT NURSE NOTE - CHIEF COMPLAINT QUOTE
daughter called EMS from [facility] because pt has been increasingly confused and disoriented; last known well is over 48 hrs ago when another family member spoke to pt; I called daughter in [place] at time of triage to confirm this; pt report left ear fullness and hearing music playing for over 1 month; denies SI/HI/VH/voices telling her to harm herself or others; daughter Ana Carlisle [phone] in [place]

## 2019-10-17 NOTE — ED PROVIDER NOTE - CLINICAL SUMMARY MEDICAL DECISION MAKING FREE TEXT BOX
EMS called as concern by daughter for increasing confusion and AMS./ pt c/o ear fullness and hearing music, no evidence of infection  -check labs, ekg  -cxr  -ct head

## 2019-10-17 NOTE — ED ADULT NURSE REASSESSMENT NOTE - NS ED NURSE REASSESS COMMENT FT1
all interventions as noted, gerardo. well, provided with warm blanket for comfort, was taken to CT, accompanied by SSA

## 2019-10-17 NOTE — ED ADULT NURSE REASSESSMENT NOTE - NS ED NURSE REASSESS COMMENT FT1
initial admit orders have been received as noted, ABX initiated awaiting sign out/bed assignment, pt. utd, with understanding verbalized

## 2019-10-17 NOTE — H&P ADULT - ASSESSMENT
80yo Female, PMHx HLD - presenting accompanied by daughter who called EMS for progressive confusion and hallucinations. Patient admitted for TME 2/2 UTI.

## 2019-10-17 NOTE — H&P ADULT - PROBLEM SELECTOR PLAN 2
- Patient not meeting SIRS criteria at this time  - UA [(+) ketones, SG 1.030, (+) Nitrites, Trace LE, Many WBC]  - In the setting of AMS, will opt to treat patient for period of 5 days  - C/w Ceftriaxone and f/u Urine Culture to narrow antibiotic as clinically appropriate.

## 2019-10-17 NOTE — BEHAVIORAL HEALTH ASSESSMENT NOTE - DETAILS
Pt reports one of her dtrs used to be addicted to drugs, is now clean and sober. Pt denies ever wishing she were dead, any prior/current suicidal ideation/intent/plan. "I had an ear ache before."

## 2019-10-17 NOTE — BEHAVIORAL HEALTH ASSESSMENT NOTE - HPI (INCLUDE ILLNESS QUALITY, SEVERITY, DURATION, TIMING, CONTEXT, MODIFYING FACTORS, ASSOCIATED SIGNS AND SYMPTOMS)
HPI from admit:  "80yo Female, PMHx HLD - presenting on insistence of daughter who called EMS for progressive confusion and hallucinations.     Patient's history is tangential and speech is seemingly pressured - however, she reports that today her daughter was increasingly concerned about her given periods of confusion and reported auditory hallucinations. The patient herself reports fatigue all of yesterday and called her Cardiologist/PCP Dr Jossue Hardin who she was meant to see and he discussed a possible UTI and need for antibiotics. She denies fevers, chills, sweats. No dysuria, but she reports urinary frequency. No suprapubic pain, no nausea/vomiting. Patient does endorse decreased PO intake, but increased ingestion of ice pops. With regards to auditory hallucinations - she has been hearing music when none is playing and her name being called out. Symptoms are on the L ear. She denies tinnitus, but did note some pressure intermittently. She reported a headache yesterday, temporal, but could not recall prior episodes of headaches.     In the ED, vitals as follows: T: Afebrile | HR: 82-86bpm | BP: 130-134/63-66mmHg | RR: 17-18/min | SpO2: 97-98% RA   Labs significant for: No Leukocytosis, Bicarbonate 21, BUN/Cr 42/1.51, UA [(+) ketones, SG 1.030, (+) Nitrites, Trace LE, Many WBC], Utox (+) Benzos, Neg ...  CTH:No hydrocephalus, midline shift, acute intracranial hemorrhage or demarcated territorial infarct.  EKG: NSR, intervals wnl - q wave aVl, t wave flattening V6 - non-specific.     Patient was administered: CTX 1g" (17 Oct 2019 03:53)    Psychiatry consult requested for above ss/sx.    On interview now, pt observed with C.O. aide present. She is pleasant, engageable. Think she has been in the hospital x 2 days. she reports, "I'm doing better." When asked why she was admitted, she replies that she's been having problems with her ears.   When asked about auditory or visual hallucinations, she reports, "If I hear music, I hear it later." She denies visual hallucinations.  She is A+ O x ~2 1/2, stating that she is in [facility], the place of her birth, it's the end of October, 2019, "Sunday, Monday, Tuesday, Wednesday...."  She denies any thoughts of self harm now or ever, though reports she has seen outpt psychotherapists throughout her life, starting as a child due to stuttering, continuing into her adulthood, mostly to deal with one of her dtr's addiction issues. HPI from admit:  "78yo Female, PMHx HLD - presenting on insistence of daughter who called EMS for progressive confusion and hallucinations.     Patient's history is tangential and speech is seemingly pressured - however, she reports that today her daughter was increasingly concerned about her given periods of confusion and reported auditory hallucinations. The patient herself reports fatigue all of yesterday and called her Cardiologist/PCP Dr Jossue Hardin who she was meant to see and he discussed a possible UTI and need for antibiotics. She denies fevers, chills, sweats. No dysuria, but she reports urinary frequency. No suprapubic pain, no nausea/vomiting. Patient does endorse decreased PO intake, but increased ingestion of ice pops. With regards to auditory hallucinations - she has been hearing music when none is playing and her name being called out. Symptoms are on the L ear. She denies tinnitus, but did note some pressure intermittently. She reported a headache yesterday, temporal, but could not recall prior episodes of headaches.     In the ED, vitals as follows: T: Afebrile | HR: 82-86bpm | BP: 130-134/63-66mmHg | RR: 17-18/min | SpO2: 97-98% RA   Labs significant for: No Leukocytosis, Bicarbonate 21, BUN/Cr 42/1.51, UA [(+) ketones, SG 1.030, (+) Nitrites, Trace LE, Many WBC], Utox (+) Benzos, Neg ...  CTH:No hydrocephalus, midline shift, acute intracranial hemorrhage or demarcated territorial infarct.  EKG: NSR, intervals wnl - q wave aVl, t wave flattening V6 - non-specific.     Patient was administered: CTX 1g" (17 Oct 2019 03:53)    Around 6 AM this morning, pt became very agitated, screaming for help, trying to get on elevator, threatening to call 911.   Code grey called, IM haldol & ativan ordered & given, pt. placed on enhanced with a sitter.    Psychiatry consult requested for above ss/sx.    On interview now, pt observed with C.O. aide present. She is pleasant, engageable. Think she has been in the hospital x 2 days. she reports, "I'm doing better." When asked why she was admitted, she replies that she's been having problems with her ears.   When asked about auditory or visual hallucinations, she reports, "If I hear music, I hear it later." She denies visual hallucinations.  She is A+ O x ~2 1/2, stating that she is in [facility], the place of her birth, it's the end of October, 2019, "Sunday, Monday, Tuesday, Wednesday...."  She denies any thoughts of self harm now or ever, though reports she has seen outpt psychotherapists throughout her life, starting as a child due to stuttering, continuing into her adulthood, mostly to deal with one of her dtr's addiction issues.

## 2019-10-17 NOTE — ED PROVIDER NOTE - CARE PLAN
Principal Discharge DX:	Urinary tract infection without hematuria, site unspecified  Secondary Diagnosis:	Auditory hallucinations

## 2019-10-17 NOTE — PHYSICAL THERAPY INITIAL EVALUATION ADULT - GENERAL OBSERVATIONS, REHAB EVAL
Pt found supine in bed in NAD, PCA present for enhanced supervision, agreeable to PT eval and cleared by MARCIE Botello.

## 2019-10-17 NOTE — H&P ADULT - NSHPPHYSICALEXAM_GEN_ALL_CORE
PHYSICAL EXAM:  Constitutional: Patient seated comfortably in bed, of appropriate color, nutrition, and hydration.   HEENT: PERRLA, Normal Range of eye movement with no complaint of diplopia, Normal Hearing  Neck: No LAD, No JVD  Back: Normal spine flexure, No CVA tenderness  Respiratory: Normal air entry, Lungs clear to auscultation b/l w/o wheeze or crepitations.   Cardiovascular: S1 and S2 present - no additional abnormal sounds or murmurs. Normal rhythm and rate of pulse.   Gastrointestinal: BS+, soft, NT/ND  Extremities: No peripheral edema  Vascular: 2+ peripheral pulses  Neurological: A/O x 3, CN V-XII grossly intact.  Psychiatric: Normal mood, normal affect  Musculoskeletal: 5/5 strength b/l upper and lower extremities  Skin: No rashes PHYSICAL EXAM:  Constitutional: Patient seated comfortably in bed, of appropriate color, nutrition, and hydration.   HEENT: Notable anisocoria (in light, R pupil constricted, L dilated) - reactive to light. EOMI. Dry MM.   Neck: No LAD, No JVD  Back: Normal spine flexure, No CVA tenderness  Respiratory: Normal air entry, Lungs clear to auscultation b/l w/o wheeze or crepitations.   Cardiovascular: S1 and S2 present - no additional abnormal sounds or murmurs. Normal rhythm and rate of pulse.   Gastrointestinal: BS+, soft, NT/ND  Extremities: No peripheral edema  Vascular: 2+ peripheral pulses  Neurological: A/O x 3, CN V-XII grossly intact.  Psychiatric: Normal mood, normal affect  Musculoskeletal: 5/5 strength b/l upper and lower extremities  Skin: No rashes PHYSICAL EXAM:  Constitutional: Patient seated comfortably in bed, of appropriate color, nutrition, and hydration.   HEENT: Notable anisocoria (in light, R pupil constricted, L dilated) - reactive to light. EOMI. Dry MM.   Neck: No LAD, No JVD  Back: Normal spine flexure, No CVA tenderness  Respiratory: Normal air entry, Lungs clear to auscultation b/l w/o wheeze or crepitations.   Cardiovascular: S1 and S2 present - no additional abnormal sounds or murmurs. Normal rhythm and rate of pulse.   Gastrointestinal: BS+, soft, NT/ND  Extremities: No peripheral edema  Vascular: 2+ peripheral pulses  Neurological: A/O x 3, CN V-XII grossly intact. No dysmetria, no disdiadochokinesia.   Psychiatric: Pressured speech, tangential thought   Musculoskeletal: 5/5 strength b/l upper and lower extremities  Skin: No rashes

## 2019-10-17 NOTE — BEHAVIORAL HEALTH ASSESSMENT NOTE - OTHER PAST PSYCHIATRIC HISTORY (INCLUDE DETAILS REGARDING ONSET, COURSE OF ILLNESS, INPATIENT/OUTPATIENT TREATMENT)
As above.  No hx of inpt admits.  Prior hx of outpt tx.  Reports she was given medication for panic attacks but, "it didn't agree with me."  NB: Pt's outpt rx list includes lexapro 10 mg po qdaily and xanax 0.5 mg po bid

## 2019-10-17 NOTE — ED ADULT NURSE REASSESSMENT NOTE - NS ED NURSE REASSESS COMMENT FT1
pt. ambulated to restroom and back to bed with steady gait noted, accompanied by EDT, sample was obtained and sent to lab for analysis, assessment on-going

## 2019-10-17 NOTE — BEHAVIORAL HEALTH ASSESSMENT NOTE - SUMMARY
80yo Female, with medical hx of HLD and HTN, likely psychiatric hx of depression and/or anxiety/panic disorder (based on rx with SSRI and Xanax), now admitted for progressive confusion and hallucinations, found to have UTI.  Pt with episode of increased agitation, paranoia, distress overnight, leading to Code Grey, rx with IM Haldol and Ativan, as well as initiation of E.S.  Pt currently calm, reasonably oriented, still with some vague report of AH (hearing music even after it's been played). No SI/HI.  Given above, pt with likely delirium superimposed on possible cognitive deficits i.e. MCI or dementia.    For now:    Continue E.S. with sitter.  Continue Lexapro. 78yo Female, with medical hx of HLD and HTN, likely psychiatric hx of depression and/or anxiety/panic disorder (based on rx with SSRI and Xanax), now admitted for progressive confusion and hallucinations, found to have UTI.  Pt with episode of increased agitation, paranoia, distress overnight, leading to Code Grey, rx with IM Haldol and Ativan, as well as initiation of E.S.  Pt currently calm, reasonably oriented, still with some vague report of AH (hearing music even after it's been played). No SI/HI.  Given above, pt with likely delirium superimposed on possible cognitive deficits i.e. MCI or dementia.    For now:    --Continue E.S. with sitter.  --Continue Lexapro.  --Avoid prn Xanax, Ambien, Benadryl/other anticholinergic rx.  --Monitor VSS for possible benzo WD. If pt suspected to be in WD, would rx with lorazepam rather than alprazolam, to avoid build up of alprazolam's active metabolites.  --Recommend prn Seroquel 0.25-0.50 mg po q4-6 hours prn extreme agitation unresponsive to verbal redirection, Haldol 2mg IM q 4-6 hours if pt refuses po.    --**Please obtain EKG (serial EKG's if pt on frequent prns/standing antipsychotic rx), to monitor for possible QTC prolongation due to antipsychotic rx.

## 2019-10-17 NOTE — BEHAVIORAL HEALTH ASSESSMENT NOTE - NSBHCHARTREVIEWVS_PSY_A_CORE FT
ICU Vital Signs Last 24 Hrs  T(C): 36.6 (17 Oct 2019 13:33), Max: 36.8 (17 Oct 2019 03:45)  T(F): 97.9 (17 Oct 2019 13:33), Max: 98.3 (17 Oct 2019 03:45)  HR: 84 (17 Oct 2019 13:33) (80 - 86)  BP: 121/57 (17 Oct 2019 13:33) (121/57 - 150/98)  BP(mean): --  ABP: --  ABP(mean): --  RR: 18 (17 Oct 2019 13:33) (17 - 18)  SpO2: 96% (17 Oct 2019 13:33) (96% - 99%)

## 2019-10-17 NOTE — H&P ADULT - HISTORY OF PRESENT ILLNESS
78yo Female, PMHx HLD - presenting on insistence of daughter who called EMS for progressive confusion and hallucinations.       In the ED, vitals as follows: T: Afebrile | HR: 82-86bpm | BP: 130-134/63-66mmHg | RR: 17-18/min | SpO2: 97-98% RA   Labs significant for: No Leukocytosis, Bicarbonate 21, BUN/Cr 42/1.51, UA [(+) ketones, SG 1.030, (+) Nitrites, Trace LE, Many WBC], Utox (+) Benzos, Neg HAYDER  Imaging:  CTH:  No hydrocephalus, midline shift, acute intracranial hemorrhage or   demarcated territorial infarct.  EKG: NSR, intervals wnl - q wave aVl, t wave flattening V6 - non-specific.     Patient was administered: CTX 1g 80yo Female, PMHx HLD - presenting on insistence of daughter who called EMS for progressive confusion and hallucinations.     Patient's history is tangential and speech is seemingly pressured - however, she reports that today her daughter was increasingly concerned about her given periods of confusion and reported auditory hallucinations. The patient herself reports fatigue all of yesterday and called her Cardiologist/PCP Dr Jossue Hardin who she was meant to see and he discussed a possible UTI and need for antibiotics. She denies fevers, chills, sweats. No dysuria, but she reports urinary frequency. No suprapubic pain, no nausea/vomiting. Patient does endorse decreased PO intake, but increased ingestion of ice pops. With regards to auditory hallucinations - she has been hearing music when none is playing and her name being called out. Symptoms are on the L ear. She denies tinnitus, but did note some pressure intermittently. She reported a headache yesterday, temporal, but could not recall prior episodes of headaches.     In the ED, vitals as follows: T: Afebrile | HR: 82-86bpm | BP: 130-134/63-66mmHg | RR: 17-18/min | SpO2: 97-98% RA   Labs significant for: No Leukocytosis, Bicarbonate 21, BUN/Cr 42/1.51, UA [(+) ketones, SG 1.030, (+) Nitrites, Trace LE, Many WBC], Utox (+) Benzos, Neg HAYDER  Imaging:  CTH:  No hydrocephalus, midline shift, acute intracranial hemorrhage or   demarcated territorial infarct.  EKG: NSR, intervals wnl - q wave aVl, t wave flattening V6 - non-specific.     Patient was administered: CTX 1g

## 2019-10-17 NOTE — PATIENT PROFILE ADULT - STATED REASON FOR ADMISSION
UTI Claims has been having urinary problems for months & decided she needed to do something about it.  (Daughter actually called EMS for a wellness check due to AMS & confusion for past three days via phone)

## 2019-10-17 NOTE — ED PROVIDER NOTE - PROGRESS NOTE DETAILS
+UTI, will treat with ceftriaxone, will admit to medicine to ensure pt safe at home alone.  pt may benefit from MRI to r/o acoustic neuroma

## 2019-10-17 NOTE — H&P ADULT - PROBLEM SELECTOR PLAN 1
- Patient with a 4 week history of altered mental status w/ reported confusion and auditory hallucinations. - Patient with a 4 week history of altered mental status w/ reported confusion and auditory hallucinations.  - TME may be in relation to underlying UTI  - However, patient also with pressured speech, reported auditory hallucinations, and anisocoria.   - Unknown prior history of psychiatric illness - patient previously w/ anxiety/depression   - No firm diagnosis of dementia, but will persist with ruling out organic causes (TSH, RPR, B12, Folate)   - CTH negative for any acute pathology - however, given acute auditory hallucinations and anisocoria, possibility of mass effect, which would be elucidated on MRI? Patient denying other classic signs like persistent headache, tinnitus, etc.   - Continue to monitor mental status with treatment of infection.   - Obtain collateral from daughter regarding her baseline mental status. - Patient with a 4 week history of altered mental status w/ reported confusion and auditory hallucinations.  - TME may be in relation to underlying UTI  - However, patient also with pressured speech, reported auditory hallucinations, and anisocoria.   - Unknown prior history of psychiatric illness - patient previously w/ anxiety/depression   - No firm diagnosis of dementia, but will persist with ruling out organic causes (TSH, RPR, B12, Folate)   - CTH negative for any acute pathology - however, given acute auditory hallucinations and anisocoria, possibility of mass effect, which would be elucidated on MRI? Patient denying other classic signs like persistent headache, tinnitus, etc.   - Continue to monitor mental status with treatment of infection.   - Obtain collateral from daughter regarding her baseline mental status and any psych conditions.   - Consider psych consult.

## 2019-10-17 NOTE — PHYSICAL THERAPY INITIAL EVALUATION ADULT - ADDITIONAL COMMENTS
Pt reports living alone in elevator access apartment, [address]. Pt reports being completely independent with all ADL's and functional mobility without use of an assistive device. Pt reports if assistance is needed, neighbors in building can assist.

## 2019-10-17 NOTE — ED ADULT NURSE NOTE - OBJECTIVE STATEMENT
daughter called EMS from [facility] because pt has been increasingly confused and disoriented; last known well is over 48 hrs ago when another family member spoke to pt; I called daughter in [place] at time of triage to confirm this; pt report left ear fullness and hearing music playing for over 1 month; denies SI/HI/VH/voices telling her to harm herself or others; daughter Ana Carlisle [phone] in [place]  also states she occasionally hears someone calling her name, but no one is there  no focal neuro deficits appreciated

## 2019-10-17 NOTE — BEHAVIORAL HEALTH ASSESSMENT NOTE - NSBHREFERDETAILS_PSY_A_CORE_FT
79 year old female with hx of increasing confusion over the last month, auditory and visual hallucinations. 79 year old female with hx of increasing confusion over the last month, auditory and visual hallucinations. Around 6 AM this morning, pt became very agitated, screaming for help, trying to get on elevator, threatening to call 911.   Code grey called, IM haldol & ativan ordered & given, pt. placed on enhanced with a sitter.

## 2019-10-17 NOTE — BEHAVIORAL HEALTH ASSESSMENT NOTE - DIFFERENTIAL
Delirium--UTI, long acting benzo (xanax) in list of possible etiologies  Hx of Depression (rx with SSRI)  Major Neurocognitive Disorder

## 2019-10-17 NOTE — CONSULT NOTE ADULT - ASSESSMENT
per Internal Medicine    80yo Female, PMHx HLD - presenting accompanied by daughter who called EMS for progressive confusion and hallucinations. Patient admitted for TME 2/2 UTI.     Problem/Plan - 1:  ·  Problem: Toxic metabolic encephalopathy.  Plan: - Patient with a 4 week history of altered mental status w/ reported confusion and auditory hallucinations.  - TME may be in relation to underlying UTI  - However, patient also with pressured speech, reported auditory hallucinations, and anisocoria.   - Unknown prior history of psychiatric illness - patient previously w/ anxiety/depression   - No firm diagnosis of dementia, but will persist with ruling out organic causes (TSH, RPR, B12, Folate)   - CTH negative for any acute pathology - however, given acute auditory hallucinations and anisocoria, possibility of mass effect, which would be elucidated on MRI? Patient denying other classic signs like persistent headache, tinnitus, etc.   - Continue to monitor mental status with treatment of infection.   - Obtain collateral from daughter regarding her baseline mental status and any psych conditions.   - Consider psych consult.    Problem/Plan - 2:  ·  Problem: UTI (urinary tract infection).  Plan: - Patient not meeting SIRS criteria at this time  - UA [(+) ketones, SG 1.030, (+) Nitrites, Trace LE, Many WBC]  - In the setting of AMS, will opt to treat patient for period of 5 days  - C/w Ceftriaxone and f/u Urine Culture to narrow antibiotic as clinically appropriate.     Problem/Plan - 3:  ·  Problem: EDI (acute kidney injury).  Plan: - Patient presenting with elevated BUN/Cr - 42/1.51 - w/ an unknown baseline  - Patient reporting decreased PO intake of late, which is supported by ketonuria and elevated SG on UA, 1.030.   - Suspect, therefore, a pre-renal etiology for current EDI  - F/u urine lytes to confirm etiology   - C/w gentle IVF hydration for 8 hours.     Problem/Plan - 4:  ·  Problem: Anxiety.  Plan: - Patient reports occasional anxiety and uses Xanax (unknown dose) every other day.   - Med Rec required.    Problem/Plan - 5:  ·  Problem: Nutrition, metabolism, and development symptoms. Plan: F: NS at 90cc/hr.   E: Replete PRN  N: Regular  Prophylaxis: None  Dispo: [facility].

## 2019-10-17 NOTE — BEHAVIORAL HEALTH ASSESSMENT NOTE - NSBHSOCIALHXDETAILSFT_PSY_A_CORE
Born at [facility].  x >45 years to [facility]. Has two adult dtrs and 5 grandchildren.

## 2019-10-17 NOTE — H&P ADULT - NSHPLABSRESULTS_GEN_ALL_CORE
11.7   9.68  )-----------( 204      ( 17 Oct 2019 02:13 )             35.4       10-17    138  |  102  |  42<H>  ----------------------------<  102<H>  4.4   |  21<L>  |  1.51<H>    Ca    10.0      17 Oct 2019 02:13  Mg     2.3     10-17    TPro  7.2  /  Alb  4.4  /  TBili  0.9  /  DBili  x   /  AST  28  /  ALT  16  /  AlkPhos  66  10-17              Urinalysis Basic - ( 17 Oct 2019 03:00 )    Color: Yellow / Appearance: SL Cloudy / SG: >=1.030 / pH: x  Gluc: x / Ketone: 15 mg/dL  / Bili: Negative / Urobili: 1.0 E.U./dL   Blood: x / Protein: 100 mg/dL / Nitrite: POSITIVE   Leuk Esterase: Trace / RBC: < 5 /HPF / WBC Many /HPF   Sq Epi: x / Non Sq Epi: 0-5 /HPF / Bacteria: Present /HPF    PT/INR - ( 17 Oct 2019 02:13 )   PT: 11.4 sec;   INR: 1.01          PTT - ( 17 Oct 2019 02:13 )  PTT:25.5 sec    Lactate Trend      CARDIAC MARKERS ( 17 Oct 2019 02:13 )  x     / <0.01 ng/mL / x     / x     / x            CAPILLARY BLOOD GLUCOSE      POCT Blood Glucose.: 111 mg/dL (17 Oct 2019 01:51)

## 2019-10-17 NOTE — PROVIDER CONTACT NOTE (CHANGE IN STATUS NOTIFICATION) - SITUATION
Pt. has become very agitated, screaming for help, trying to get on elevator, threatening to call 911

## 2019-10-18 ENCOUNTER — TRANSCRIPTION ENCOUNTER (OUTPATIENT)
Age: 79
End: 2019-10-18

## 2019-10-18 DIAGNOSIS — I34.1 NONRHEUMATIC MITRAL (VALVE) PROLAPSE: ICD-10-CM

## 2019-10-18 DIAGNOSIS — F32.9 MAJOR DEPRESSIVE DISORDER, SINGLE EPISODE, UNSPECIFIED: ICD-10-CM

## 2019-10-18 DIAGNOSIS — I10 ESSENTIAL (PRIMARY) HYPERTENSION: ICD-10-CM

## 2019-10-18 LAB
ANION GAP SERPL CALC-SCNC: 13 MMOL/L — SIGNIFICANT CHANGE UP (ref 5–17)
BUN SERPL-MCNC: 24 MG/DL — HIGH (ref 7–23)
CALCIUM SERPL-MCNC: 9 MG/DL — SIGNIFICANT CHANGE UP (ref 8.4–10.5)
CHLORIDE SERPL-SCNC: 106 MMOL/L — SIGNIFICANT CHANGE UP (ref 96–108)
CO2 SERPL-SCNC: 21 MMOL/L — LOW (ref 22–31)
CREAT SERPL-MCNC: 1.03 MG/DL — SIGNIFICANT CHANGE UP (ref 0.5–1.3)
GLUCOSE SERPL-MCNC: 110 MG/DL — HIGH (ref 70–99)
HCT VFR BLD CALC: 34.1 % — LOW (ref 34.5–45)
HGB BLD-MCNC: 11.1 G/DL — LOW (ref 11.5–15.5)
MAGNESIUM SERPL-MCNC: 2.3 MG/DL — SIGNIFICANT CHANGE UP (ref 1.6–2.6)
MCHC RBC-ENTMCNC: 29.8 PG — SIGNIFICANT CHANGE UP (ref 27–34)
MCHC RBC-ENTMCNC: 32.6 GM/DL — SIGNIFICANT CHANGE UP (ref 32–36)
MCV RBC AUTO: 91.7 FL — SIGNIFICANT CHANGE UP (ref 80–100)
NRBC # BLD: 0 /100 WBCS — SIGNIFICANT CHANGE UP (ref 0–0)
PLATELET # BLD AUTO: 176 K/UL — SIGNIFICANT CHANGE UP (ref 150–400)
POTASSIUM SERPL-MCNC: 3.6 MMOL/L — SIGNIFICANT CHANGE UP (ref 3.5–5.3)
POTASSIUM SERPL-SCNC: 3.6 MMOL/L — SIGNIFICANT CHANGE UP (ref 3.5–5.3)
RBC # BLD: 3.72 M/UL — LOW (ref 3.8–5.2)
RBC # FLD: 13.5 % — SIGNIFICANT CHANGE UP (ref 10.3–14.5)
SODIUM SERPL-SCNC: 140 MMOL/L — SIGNIFICANT CHANGE UP (ref 135–145)
WBC # BLD: 6.3 K/UL — SIGNIFICANT CHANGE UP (ref 3.8–10.5)
WBC # FLD AUTO: 6.3 K/UL — SIGNIFICANT CHANGE UP (ref 3.8–10.5)

## 2019-10-18 PROCEDURE — 99233 SBSQ HOSP IP/OBS HIGH 50: CPT

## 2019-10-18 PROCEDURE — 99233 SBSQ HOSP IP/OBS HIGH 50: CPT | Mod: GC

## 2019-10-18 RX ORDER — QUETIAPINE FUMARATE 200 MG/1
25 TABLET, FILM COATED ORAL EVERY 6 HOURS
Refills: 0 | Status: DISCONTINUED | OUTPATIENT
Start: 2019-10-18 | End: 2019-10-22

## 2019-10-18 RX ORDER — QUETIAPINE FUMARATE 200 MG/1
25 TABLET, FILM COATED ORAL AT BEDTIME
Refills: 0 | Status: DISCONTINUED | OUTPATIENT
Start: 2019-10-18 | End: 2019-10-22

## 2019-10-18 RX ORDER — POTASSIUM CHLORIDE 20 MEQ
40 PACKET (EA) ORAL ONCE
Refills: 0 | Status: COMPLETED | OUTPATIENT
Start: 2019-10-18 | End: 2019-10-18

## 2019-10-18 RX ORDER — HALOPERIDOL DECANOATE 100 MG/ML
2 INJECTION INTRAMUSCULAR EVERY 6 HOURS
Refills: 0 | Status: DISCONTINUED | OUTPATIENT
Start: 2019-10-18 | End: 2019-10-22

## 2019-10-18 RX ADMIN — SODIUM CHLORIDE 90 MILLILITER(S): 9 INJECTION INTRAMUSCULAR; INTRAVENOUS; SUBCUTANEOUS at 21:16

## 2019-10-18 RX ADMIN — ATORVASTATIN CALCIUM 10 MILLIGRAM(S): 80 TABLET, FILM COATED ORAL at 00:32

## 2019-10-18 RX ADMIN — Medication 40 MILLIEQUIVALENT(S): at 11:59

## 2019-10-18 RX ADMIN — SODIUM CHLORIDE 90 MILLILITER(S): 9 INJECTION INTRAMUSCULAR; INTRAVENOUS; SUBCUTANEOUS at 06:34

## 2019-10-18 RX ADMIN — Medication 25 MILLIGRAM(S): at 06:24

## 2019-10-18 RX ADMIN — CEFTRIAXONE 100 MILLIGRAM(S): 500 INJECTION, POWDER, FOR SOLUTION INTRAMUSCULAR; INTRAVENOUS at 03:31

## 2019-10-18 RX ADMIN — QUETIAPINE FUMARATE 25 MILLIGRAM(S): 200 TABLET, FILM COATED ORAL at 22:48

## 2019-10-18 RX ADMIN — ESCITALOPRAM OXALATE 10 MILLIGRAM(S): 10 TABLET, FILM COATED ORAL at 11:59

## 2019-10-18 RX ADMIN — QUETIAPINE FUMARATE 25 MILLIGRAM(S): 200 TABLET, FILM COATED ORAL at 09:27

## 2019-10-18 RX ADMIN — ATORVASTATIN CALCIUM 10 MILLIGRAM(S): 80 TABLET, FILM COATED ORAL at 21:56

## 2019-10-18 RX ADMIN — Medication 100 MILLIGRAM(S): at 14:23

## 2019-10-18 RX ADMIN — QUETIAPINE FUMARATE 25 MILLIGRAM(S): 200 TABLET, FILM COATED ORAL at 21:14

## 2019-10-18 NOTE — DISCHARGE NOTE PROVIDER - HOSPITAL COURSE
80yo Female, PMHx HLD, mitral valve prolapse, HTN presented called EMS for progressive confusion and hallucinations. Patient admitted for toxic metabolic encephalopathy 2/2 UTI.         Problem List/Main Diagnoses (system-based):         Inpatient treatment course:     New medications:     Labs to be followed outpatient:     Exam to be followed outpatient: 80yo Female, PMHx HLD, mitral valve prolapse, HTN presented called EMS for progressive confusion and hallucinations. Patient admitted for toxic metabolic encephalopathy 2/2 UTI.         Problem List/Main Diagnoses (system-based):         Toxic metabolic encephalopathy possibly 2/2 to UTI    Patient with a 4 week history of altered mental status w/ reported confusion, auditory hallucinations. Now with visual hallucinations of seeing  that passed 5 years ago.    - Unknown prior history of psychiatric illness - patient previously w/ anxiety/depression.    - Pt on home xanax 0.5 mg BID and may have been misusing. Also, hx of increase alcohol use in the past couple of months per pt's sister.    - TSH wnl, RPR neg, B12 wnl, Folate wnl     - CTH negative for any acute pathology - however, given acute auditory/visual hallucinations and anisocoria, should consider MRI as outpatient    - Pt was treated with ceftriaxone 1 mg q24 hours for UTI with improvement    - Psych consulted while pt admitted. Possibly delirium 2/2 to UTI or xanax use.    - also started on thiamine 100 mg QD        Anxiety/depression    - pt continued on home escitalopram 10 mg QD    - held xanax per psych        EDI    Patient presenting with elevated BUN/Cr - 42/1.51 - w/ an unknown baseline    - Patient reporting decreased PO intake of late, which is supported by ketonuria and elevated SG on UA, 1.030.     - Suspect, therefore, a pre-renal etiology for current EDI    - improved with IVF hydration.            Inpatient treatment course: ceftriaxone    New medications: cefpodoxime     Labs and imaging to be followed outpatient:     Exam to be followed outpatient: 78yo Female, PMHx HLD, mitral valve prolapse, HTN presented called EMS for progressive confusion and hallucinations. Patient admitted for toxic/metabolic encephalopathy 2/2 UTI, xanax use.         Problem List/Main Diagnoses (system-based):         Metabolic encephalopathy possibly 2/2 to UTI or toxic metabolic encephalopathy possibly 2/2 to xanax usage, etoh use    Patient with a 4 week history of altered mental status w/ reported confusion, auditory hallucinations. Now with visual hallucinations of seeing  that passed 5 years ago.    - Unknown prior history of psychiatric illness - patient previously w/ anxiety/depression.    - Pt on home xanax 0.5 mg BID and may have been misusing. Also, hx of increase alcohol use in the past couple of months per pt's sister.    - TSH wnl, RPR neg, B12 wnl, Folate wnl     - CTH negative for any acute pathology - however, given acute auditory/visual hallucinations and anisocoria, should consider MRI as outpatient    - Pt was treated with ceftriaxone 1 mg q24 hours for UTI with improvement    - Psych consulted while pt admitted. Possibly delirium 2/2 to UTI or xanax use.    - also started on thiamine 100 mg QD    - MRI head showing no acute infarcts. Small chronic infarcts, microvascular disease, volume loss.        Anxiety/depression    - pt continued on home escitalopram 10 mg QD    - held xanax per psych    - started on seroquel 25 mg QHS             Inpatient treatment course: ceftriaxone    New medications: none    Labs and imaging to be followed outpatient: none		    Exam to be followed outpatient: Neurological exam 78yo Female, PMHx HLD, mitral valve prolapse, HTN presented called EMS for progressive confusion and hallucinations. Patient admitted for toxic/metabolic encephalopathy 2/2 UTI, xanax use.         Problem List/Main Diagnoses (system-based):         Metabolic encephalopathy possibly 2/2 to UTI or toxic metabolic encephalopathy possibly 2/2 to xanax usage, etoh use    Patient with a 4 week history of altered mental status w/ reported confusion, auditory hallucinations. Now with visual hallucinations of seeing  that passed 5 years ago.    - Unknown prior history of psychiatric illness - patient previously w/ anxiety/depression.    - Pt on home xanax 0.5 mg BID and may have been misusing. Also, hx of increase alcohol use in the past couple of months per pt's sister.    - TSH wnl, RPR neg, B12 wnl, Folate wnl     - CTH negative for any acute pathology - however, given acute auditory/visual hallucinations and anisocoria MRI was obtained no acute infarcts. Small chronic infarcts, microvascular disease, volume loss.    - Pt was treated with ceftriaxone 1 mg q24 hours for UTI with improvement    - Psych consulted while pt admitted. Possibly delirium 2/2 to UTI or xanax use.    - also started on thiamine 100 mg QD        Anxiety/depression    - pt continued on home escitalopram 10 mg QD    - held xanax per psych    - started on seroquel 25 mg QHS and will continue            Inpatient treatment course: ceftriaxone, seroquel, thiamine 100 mg QD    New medications: seroquel 25 mg QHS    Labs and imaging to be followed outpatient: none		    Exam to be followed outpatient: Neurological exam

## 2019-10-18 NOTE — DISCHARGE NOTE PROVIDER - CARE PROVIDER_API CALL
Jossue Hardin)  Cardiovascular Disease; Internal Medicine; Nuclear Cardiology  [address] [place] [address]  Phone: [phone]  Fax: [phone]  Follow Up Time:

## 2019-10-18 NOTE — PROGRESS NOTE BEHAVIORAL HEALTH - DETAILS
"I had an ear ache before." Pt denies ever wishing she were dead, any prior/current suicidal ideation/intent/plan.

## 2019-10-18 NOTE — PROGRESS NOTE BEHAVIORAL HEALTH - NSBHFUPINTERVALHXFT_PSY_A_CORE
Pt calm and happy but had no idea she is in a hospital, confusing her daughter for her sister, and asking for discharge. "Why does my sister get to leave?" She does say AH/musical hallucinations have decreased. Spoke with team about starting seroquel 25 mg qhs standing.

## 2019-10-18 NOTE — DISCHARGE NOTE PROVIDER - NSDCCPCAREPLAN_GEN_ALL_CORE_FT
PRINCIPAL DISCHARGE DIAGNOSIS  Diagnosis: Urinary tract infection without hematuria, site unspecified  Assessment and Plan of Treatment: You were admitted for a urinary tract infection that likely caused the delirious state that you initially presented with. You completed treatment with IV antibiotics with overall improvement. It is important that you stay hydrated and drink fluids in order to prevent this from occuring.      SECONDARY DISCHARGE DIAGNOSES  Diagnosis: Auditory hallucinations  Assessment and Plan of Treatment: You initially presented you were hearing voices that were not present. This may have been related to the urinary tract infection that you were admitted for. It is important to only take your medications as prescribed. Please follow up with your primary care doctor/cardiologist, Dr. Hardin. PRINCIPAL DISCHARGE DIAGNOSIS  Diagnosis: Urinary tract infection without hematuria, site unspecified  Assessment and Plan of Treatment: You were admitted for a urinary tract infection that likely caused the delirious state that you initially presented with. You completed treatment with IV antibiotics with overall improvement. It is important that you stay hydrated and drink fluids in order to prevent this from occuring. Please seek medical attention if you develop any fever, chills, abd pain, or urinary symptoms including buring, increased frequency, and increased urgency.      SECONDARY DISCHARGE DIAGNOSES  Diagnosis: Auditory hallucinations  Assessment and Plan of Treatment: You initially presented you were hearing voices that were not present. This may have been related to the urinary tract infection that you were admitted for. It is important to only take your medications as prescribed. Please follow up with your primary care doctor/cardiologist, Dr. Hardin. Please seek medical attention if you begin to hear voices that other people do not hear.

## 2019-10-18 NOTE — PROGRESS NOTE ADULT - SUBJECTIVE AND OBJECTIVE BOX
Physical Medicine and Rehabilitation Progress Note:    Patient is a 79y old  Female who presents with a chief complaint of AMS, UTI (18 Oct 2019 16:28)      HPI:  80yo Female, PMHx HLD - presenting on insistence of daughter who called EMS for progressive confusion and hallucinations.     Patient's history is tangential and speech is seemingly pressured - however, she reports that today her daughter was increasingly concerned about her given periods of confusion and reported auditory hallucinations. The patient herself reports fatigue all of yesterday and called her Cardiologist/PCP Dr Jossue Hardin who she was meant to see and he discussed a possible UTI and need for antibiotics. She denies fevers, chills, sweats. No dysuria, but she reports urinary frequency. No suprapubic pain, no nausea/vomiting. Patient does endorse decreased PO intake, but increased ingestion of ice pops. With regards to auditory hallucinations - she has been hearing music when none is playing and her name being called out. Symptoms are on the L ear. She denies tinnitus, but did note some pressure intermittently. She reported a headache yesterday, temporal, but could not recall prior episodes of headaches.     In the ED, vitals as follows: T: Afebrile | HR: 82-86bpm | BP: 130-134/63-66mmHg | RR: 17-18/min | SpO2: 97-98% RA   Labs significant for: No Leukocytosis, Bicarbonate 21, BUN/Cr 42/1.51, UA [(+) ketones, SG 1.030, (+) Nitrites, Trace LE, Many WBC], Utox (+) Benzos, Neg HAYDER  Imaging:  CTH:  No hydrocephalus, midline shift, acute intracranial hemorrhage or   demarcated territorial infarct.  EKG: NSR, intervals wnl - q wave aVl, t wave flattening V6 - non-specific.     Patient was administered: CTX 1g (17 Oct 2019 03:53)                            11.1   6.30  )-----------( 176      ( 18 Oct 2019 07:54 )             34.1       10-18    140  |  106  |  24<H>  ----------------------------<  110<H>  3.6   |  21<L>  |  1.03    Ca    9.0      18 Oct 2019 07:54  Mg     2.3     10-18    TPro  7.2  /  Alb  4.4  /  TBili  0.9  /  DBili  x   /  AST  28  /  ALT  16  /  AlkPhos  66  10-17    Vital Signs Last 24 Hrs  T(C): 37 (18 Oct 2019 14:00), Max: 37 (18 Oct 2019 14:00)  T(F): 98.6 (18 Oct 2019 14:00), Max: 98.6 (18 Oct 2019 14:00)  HR: 84 (18 Oct 2019 14:00) (71 - 84)  BP: 106/63 (18 Oct 2019 14:00) (106/63 - 131/69)  BP(mean): --  RR: 16 (18 Oct 2019 14:00) (16 - 20)  SpO2: 96% (18 Oct 2019 14:00) (94% - 97%)    MEDICATIONS  (STANDING):  atorvastatin 10 milliGRAM(s) Oral at bedtime  cefTRIAXone   IVPB 1000 milliGRAM(s) IV Intermittent every 24 hours  escitalopram 10 milliGRAM(s) Oral daily  metoprolol succinate ER 25 milliGRAM(s) Oral daily  QUEtiapine 25 milliGRAM(s) Oral at bedtime  sodium chloride 0.9%. 1000 milliLiter(s) (90 mL/Hr) IV Continuous <Continuous>  thiamine 100 milliGRAM(s) Oral every 24 hours    MEDICATIONS  (PRN):  haloperidol    Injectable 2 milliGRAM(s) IntraMuscular every 6 hours PRN agitation  QUEtiapine 25 milliGRAM(s) Oral every 6 hours PRN agitation    Currently Undergoing Physical Therapy at bedside.    Functional Status Assessment: 10/17/2019    Previous Level of Function:     · Ambulation Skills	independent	  · Transfer Skills	independent	  · ADL Skills	independent	  · Work/Leisure Activity	independent	  · Additional Comments	Pt reports living alone in elevator access apartment, [address]. Pt reports being completely independent with all ADL's and functional mobility without use of an assistive device. Pt reports if assistance is needed, neighbors in building can assist.	    Cognitive Status Examination:   · Orientation	person; place; situation; birthday, current year and month	  · Level of Consciousness	alert	  · Follows Commands and Answers Questions	100% of the time; able to follow multistep instructions	  · Personal Safety and Judgment	impaired; impulsive behaviors	    Range of Motion Exam:   · Active Range of Motion Examination	no Active ROM deficits were identified	    Manual Muscle Testing:   · Manual Muscle Testing Results	no strength deficits were identified	    Bed Mobility: Rolling/Turning:     · Level of [place]	independent	    Bed Mobility: Scooting/Bridging:     · Level of [place]	independent	    Bed Mobility: Sit to Supine:     · Level of [place]	independent	    Bed Mobility: Supine to Sit:     · Level of [place]	independent	    Transfer: Sit to Stand:     · Level of [place]	independent	  · Weight-Bearing Restrictions	weight-bearing as tolerated	    Transfer: Stand to Sit:     · Level of [place]	independent	  · Weight-Bearing Restrictions	weight-bearing as tolerated	    Gait Skills:     · Level of [place]	independent	  · Weight-Bearing Restrictions	weight-bearing as tolerated	  · Gait Distance	200 feet	    Gait Analysis:     · Gait Pattern Used	2-point gait	    Stair Negotiation:     · Level of [place]	independent	  · Weight-Bearing Restrictions	weight-bearing as tolerated	  · Assistive Device	right rail up; right rail down	  · Stair Pattern	step over step	  · Number of Stairs	12	    Balance Skills Assessment:     · Sitting Balance: Static	normal balance	  · Sitting Balance: Dynamic	normal balance	  · Sit-to-Stand Balance	normal balance	  · Standing Balance: Static	normal balance	  · Standing Balance: Dynamic	normal balance	    Sensory Examination:   Sensory Examination:    Light Touch Sensation:   · Left UE	within normal limits	  · Right UE	within normal limits	  · Left LE	within normal limits	  · Right LE	within normal limits	      Fine Motor Coordination:   Fine Motor Coordination Examination:    Fine Motor Coordination:   · Left Hand, Finger to Nose	normal performance	  · Right Hand, Finger to Nose	normal performance	    Treatment Location:   · Comments	CN II - XII grossly intact, visual fields grossly WNL in all quadrants, normal visual tracking no nystagmus noted	        PM&R Impression: as above    Current Disposition Plan Recommendations: d/c home with no post discharge rehab needs, undergoing Neuro workup

## 2019-10-18 NOTE — PROGRESS NOTE BEHAVIORAL HEALTH - OTHER
Not tested In bed elevated Pt denies auditory or visual hallucinations. Does report that after she hears music, she hears it later. 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 Thinning eyebrows Reports of AMS x 1 month

## 2019-10-18 NOTE — PROGRESS NOTE ADULT - PROBLEM SELECTOR PLAN 4
- Patient reports occasional anxiety and uses Xanax .5 mg BID   - will hold xanax for now  - per psych recs, pt has seroquel 25 mg PO q6h prn for agitation and also has haldol 2 mg IM q6h prn if pt refuses seroquel - Patient reports occasional anxiety and uses Xanax .5 mg BID   - will hold xanax for now  - per psych recs, have started pt on standing seroquel 25 mg QHS. Pt has seroquel 25 mg PO q6h prn for agitation and also has haldol 2 mg IM q6h prn if pt refuses seroquel

## 2019-10-18 NOTE — PROGRESS NOTE ADULT - SUBJECTIVE AND OBJECTIVE BOX
OVERNIGHT EVENTS: No acute overnight events.    SUBJECTIVE / INTERVAL HPI: Patient seen and examined at bedside. Pt reports feeling anxious this morning because she would like to go home. Denies any fever, chills, chest pain, N/V/D, abd pain, or urinary symptoms. She reports that she is no longing having any auditory or visual hallucinations.     VITAL SIGNS:  Vital Signs Last 24 Hrs  T(C): 37 (18 Oct 2019 14:00), Max: 37 (18 Oct 2019 14:00)  T(F): 98.6 (18 Oct 2019 14:00), Max: 98.6 (18 Oct 2019 14:00)  HR: 84 (18 Oct 2019 14:00) (71 - 84)  BP: 106/63 (18 Oct 2019 14:00) (106/63 - 131/69)  BP(mean): --  RR: 16 (18 Oct 2019 14:00) (16 - 20)  SpO2: 96% (18 Oct 2019 14:00) (94% - 97%)    PHYSICAL EXAM:    General: WDWN, resting in bed, NAD  HEENT: NC/AT; PERRL, anisocoria right pupil 2 mm, left pupil 5 mm, anicteric sclera; MMM  Neck: supple  Cardiovascular: +S1/S2; RRR  Respiratory: CTA B/L; no W/R/R  Gastrointestinal: soft, NT/ND; +BSx4  Extremities: WWP; no edema, clubbing or cyanosis  Vascular: 2+ radial, DP/PT pulses B/L  Neurological: AAOx3; no focal deficits, CN II-XII intact, strength 5/5 b/l UE and LE, sensations intact b/l UE and LE, no dysmetria, no pronator drift    MEDICATIONS:  MEDICATIONS  (STANDING):  atorvastatin 10 milliGRAM(s) Oral at bedtime  cefTRIAXone   IVPB 1000 milliGRAM(s) IV Intermittent every 24 hours  escitalopram 10 milliGRAM(s) Oral daily  metoprolol succinate ER 25 milliGRAM(s) Oral daily  sodium chloride 0.9%. 1000 milliLiter(s) (90 mL/Hr) IV Continuous <Continuous>  thiamine 100 milliGRAM(s) Oral every 24 hours    MEDICATIONS  (PRN):  haloperidol    Injectable 2 milliGRAM(s) IntraMuscular every 6 hours PRN agitation  QUEtiapine 25 milliGRAM(s) Oral every 6 hours PRN agitation      ALLERGIES:  Allergies    No Known Allergies    Intolerances        LABS:                        11.1   6.30  )-----------( 176      ( 18 Oct 2019 07:54 )             34.1     10-18    140  |  106  |  24<H>  ----------------------------<  110<H>  3.6   |  21<L>  |  1.03    Ca    9.0      18 Oct 2019 07:54  Mg     2.3     10-18    TPro  7.2  /  Alb  4.4  /  TBili  0.9  /  DBili  x   /  AST  28  /  ALT  16  /  AlkPhos  66  10-17    PT/INR - ( 17 Oct 2019 02:13 )   PT: 11.4 sec;   INR: 1.01          PTT - ( 17 Oct 2019 02:13 )  PTT:25.5 sec  Urinalysis Basic - ( 17 Oct 2019 03:00 )    Color: Yellow / Appearance: SL Cloudy / SG: >=1.030 / pH: x  Gluc: x / Ketone: 15 mg/dL  / Bili: Negative / Urobili: 1.0 E.U./dL   Blood: x / Protein: 100 mg/dL / Nitrite: POSITIVE   Leuk Esterase: Trace / RBC: < 5 /HPF / WBC Many /HPF   Sq Epi: x / Non Sq Epi: 0-5 /HPF / Bacteria: Present /HPF      CAPILLARY BLOOD GLUCOSE      POCT Blood Glucose.: 111 mg/dL (17 Oct 2019 01:51)      RADIOLOGY & ADDITIONAL TESTS: Reviewed.

## 2019-10-18 NOTE — DISCHARGE NOTE PROVIDER - NSDCFUADDAPPT_GEN_ALL_CORE_FT
Appointment currently scheduled with Dr. Hardin for 11/12/19 at 2:40 PM. The office will call you when a sooner appointment is available.

## 2019-10-18 NOTE — PROGRESS NOTE ADULT - PROBLEM SELECTOR PLAN 2
- Patient not meeting SIRS criteria at this time  - UA [(+) ketones, SG 1.030, (+) Nitrites, Trace LE, Many WBC]  - In the setting of AMS, will opt to treat patient for period of 5 days  - C/w Ceftriaxone, urine culture + for >100,000 e.coli. F/u sensitivities for antibiotic as clinically appropriate.

## 2019-10-19 LAB
-  AMPICILLIN/SULBACTAM: SIGNIFICANT CHANGE UP
-  AMPICILLIN: SIGNIFICANT CHANGE UP
-  CEFAZOLIN: SIGNIFICANT CHANGE UP
-  CEFTRIAXONE: SIGNIFICANT CHANGE UP
-  GENTAMICIN: SIGNIFICANT CHANGE UP
-  NITROFURANTOIN: SIGNIFICANT CHANGE UP
-  PIPERACILLIN/TAZOBACTAM: SIGNIFICANT CHANGE UP
-  TOBRAMYCIN: SIGNIFICANT CHANGE UP
-  TRIMETHOPRIM/SULFAMETHOXAZOLE: SIGNIFICANT CHANGE UP
ANION GAP SERPL CALC-SCNC: 11 MMOL/L — SIGNIFICANT CHANGE UP (ref 5–17)
BUN SERPL-MCNC: 19 MG/DL — SIGNIFICANT CHANGE UP (ref 7–23)
CALCIUM SERPL-MCNC: 9.2 MG/DL — SIGNIFICANT CHANGE UP (ref 8.4–10.5)
CHLORIDE SERPL-SCNC: 107 MMOL/L — SIGNIFICANT CHANGE UP (ref 96–108)
CO2 SERPL-SCNC: 22 MMOL/L — SIGNIFICANT CHANGE UP (ref 22–31)
CREAT SERPL-MCNC: 0.84 MG/DL — SIGNIFICANT CHANGE UP (ref 0.5–1.3)
CULTURE RESULTS: SIGNIFICANT CHANGE UP
GLUCOSE SERPL-MCNC: 93 MG/DL — SIGNIFICANT CHANGE UP (ref 70–99)
HCT VFR BLD CALC: 33.5 % — LOW (ref 34.5–45)
HGB BLD-MCNC: 11 G/DL — LOW (ref 11.5–15.5)
MAGNESIUM SERPL-MCNC: 2.4 MG/DL — SIGNIFICANT CHANGE UP (ref 1.6–2.6)
MCHC RBC-ENTMCNC: 30 PG — SIGNIFICANT CHANGE UP (ref 27–34)
MCHC RBC-ENTMCNC: 32.8 GM/DL — SIGNIFICANT CHANGE UP (ref 32–36)
MCV RBC AUTO: 91.3 FL — SIGNIFICANT CHANGE UP (ref 80–100)
METHOD TYPE: SIGNIFICANT CHANGE UP
NRBC # BLD: 0 /100 WBCS — SIGNIFICANT CHANGE UP (ref 0–0)
ORGANISM # SPEC MICROSCOPIC CNT: SIGNIFICANT CHANGE UP
ORGANISM # SPEC MICROSCOPIC CNT: SIGNIFICANT CHANGE UP
PLATELET # BLD AUTO: 168 K/UL — SIGNIFICANT CHANGE UP (ref 150–400)
POTASSIUM SERPL-MCNC: 3.9 MMOL/L — SIGNIFICANT CHANGE UP (ref 3.5–5.3)
POTASSIUM SERPL-SCNC: 3.9 MMOL/L — SIGNIFICANT CHANGE UP (ref 3.5–5.3)
RBC # BLD: 3.67 M/UL — LOW (ref 3.8–5.2)
RBC # FLD: 13.5 % — SIGNIFICANT CHANGE UP (ref 10.3–14.5)
SODIUM SERPL-SCNC: 140 MMOL/L — SIGNIFICANT CHANGE UP (ref 135–145)
SPECIMEN SOURCE: SIGNIFICANT CHANGE UP
WBC # BLD: 6.15 K/UL — SIGNIFICANT CHANGE UP (ref 3.8–10.5)
WBC # FLD AUTO: 6.15 K/UL — SIGNIFICANT CHANGE UP (ref 3.8–10.5)

## 2019-10-19 PROCEDURE — 99233 SBSQ HOSP IP/OBS HIGH 50: CPT

## 2019-10-19 RX ORDER — POLYETHYLENE GLYCOL 3350 17 G/17G
17 POWDER, FOR SOLUTION ORAL DAILY
Refills: 0 | Status: DISCONTINUED | OUTPATIENT
Start: 2019-10-19 | End: 2019-10-22

## 2019-10-19 RX ORDER — SENNA PLUS 8.6 MG/1
2 TABLET ORAL AT BEDTIME
Refills: 0 | Status: DISCONTINUED | OUTPATIENT
Start: 2019-10-19 | End: 2019-10-22

## 2019-10-19 RX ORDER — DOCUSATE SODIUM 100 MG
100 CAPSULE ORAL AT BEDTIME
Refills: 0 | Status: DISCONTINUED | OUTPATIENT
Start: 2019-10-19 | End: 2019-10-22

## 2019-10-19 RX ADMIN — ESCITALOPRAM OXALATE 10 MILLIGRAM(S): 10 TABLET, FILM COATED ORAL at 12:00

## 2019-10-19 RX ADMIN — ATORVASTATIN CALCIUM 10 MILLIGRAM(S): 80 TABLET, FILM COATED ORAL at 22:25

## 2019-10-19 RX ADMIN — QUETIAPINE FUMARATE 25 MILLIGRAM(S): 200 TABLET, FILM COATED ORAL at 22:25

## 2019-10-19 RX ADMIN — Medication 100 MILLIGRAM(S): at 12:00

## 2019-10-19 RX ADMIN — Medication 25 MILLIGRAM(S): at 06:06

## 2019-10-19 RX ADMIN — CEFTRIAXONE 100 MILLIGRAM(S): 500 INJECTION, POWDER, FOR SOLUTION INTRAMUSCULAR; INTRAVENOUS at 03:38

## 2019-10-19 RX ADMIN — Medication 100 MILLIGRAM(S): at 22:25

## 2019-10-19 RX ADMIN — Medication 1 DROP(S): at 23:52

## 2019-10-19 RX ADMIN — SENNA PLUS 2 TABLET(S): 8.6 TABLET ORAL at 23:52

## 2019-10-19 NOTE — PROGRESS NOTE ADULT - PROBLEM SELECTOR PLAN 7
1) PCP Contacted on Admission: (Y/N) --> Name & Phone #: Jossue Hardin   2) Date of Contact with PCP: 10/17, collateral obtained  3) PCP Contacted at Discharge: (Y/N)  4) Summary of Handoff Given to PCP:   5) Post-Discharge Appointment Date and Location:

## 2019-10-19 NOTE — PROGRESS NOTE ADULT - SUBJECTIVE AND OBJECTIVE BOX
79 year old female patient with AMS and UTI. (18 Oct 2019 16:58)      INTERVAL HPI/OVERNIGHT EVENTS: patient reports feeling well, doesnt reports any voernight events or discmofrt, reprots she would like to assign a healthcare proxy and her relatives will be in tomorrow to talk about it.     Review of Systems: 12 point review of systems otherwise negative    MEDICATIONS  (STANDING):  atorvastatin 10 milliGRAM(s) Oral at bedtime  cefTRIAXone   IVPB 1000 milliGRAM(s) IV Intermittent every 24 hours  escitalopram 10 milliGRAM(s) Oral daily  metoprolol succinate ER 25 milliGRAM(s) Oral daily  QUEtiapine 25 milliGRAM(s) Oral at bedtime  sodium chloride 0.9%. 1000 milliLiter(s) (90 mL/Hr) IV Continuous <Continuous>  thiamine 100 milliGRAM(s) Oral every 24 hours    MEDICATIONS  (PRN):  haloperidol    Injectable 2 milliGRAM(s) IntraMuscular every 6 hours PRN agitation  QUEtiapine 25 milliGRAM(s) Oral every 6 hours PRN agitation      Allergies    No Known Allergies    Intolerances          Vital Signs Last 24 Hrs  T(C): 36.6 (19 Oct 2019 13:48), Max: 36.6 (18 Oct 2019 20:58)  T(F): 97.9 (19 Oct 2019 13:48), Max: 97.9 (18 Oct 2019 20:58)  HR: 90 (19 Oct 2019 13:48) (71 - 90)  BP: 150/79 (19 Oct 2019 13:48) (125/70 - 159/81)  BP(mean): --  RR: 20 (19 Oct 2019 13:48) (17 - 20)  SpO2: 92% (19 Oct 2019 13:48) (91% - 92%)  CAPILLARY BLOOD GLUCOSE            Physical Exam:    Daily     Daily   General:  Well appearing, NAD, not cachetic  HEENT:  Nonicteric, uneven pupils, reactive to light   CV:  RRR, no murmur, no JVD  Lungs:  CTA B/L, no wheezes, rales, rhonchi  Abdomen:  Soft, non-tender, no distended, positive BS, no hepatosplenomegaly  Extremities:  2+ pulses, no c/c, no edema  Skin:  Warm and dry, no rashes  :  No canas  Neuro:  AAOx3, non-focal, CN II-XII grossly intact  No Restraints    LABS:                        11.0   6.15  )-----------( 168      ( 19 Oct 2019 06:10 )             33.5     10-19    140  |  107  |  19  ----------------------------<  93  3.9   |  22  |  0.84    Ca    9.2      19 Oct 2019 06:10  Mg     2.4     10-19              RADIOLOGY & ADDITIONAL TESTS:    ---------------------------------------------------------------------------  I personally reviewed: [  ]EKG   [  ]CXR    [  ] CT    [  ]Other  ---------------------------------------------------------------------------  PLEASE CHECK WHEN PRESENT:     [  ]Heart Failure     [  ] Acute     [  ] Acute on Chronic     [  ] Chronic  -------------------------------------------------------------------     [  ]Diastolic [HFpEF]     [  ]Systolic [HFrEF]     [  ]Combined [HFpEF & HFrEF]     [  ]Other:  -------------------------------------------------------------------  [  ]EDI     [  ]ATN     [  ]Reneal Medullary Necrosis     [  ]Renal Cortical Necrosis     [  ]Other Pathological Lesions:    [  ]CKD 1  [  ]CKD 2  [  ]CKD 3  [  ]CKD 4  [  ]CKD 5  [  ]Other  -------------------------------------------------------------------  [  ]Other/Unspecified:    --------------------------------------------------------------------    Abdominal Nutritional Status  [  ]Malnutrition: See Nutrition Note  [  ]Cachexia  [  ]Other:   [  ]Supplement Ordered:  [  ]Morbid Obesity (BMI >=40]

## 2019-10-19 NOTE — PROGRESS NOTE ADULT - PROBLEM SELECTOR PLAN 1
concern for UTI however persistent symptoms despite antibiotic therapy  psych following the patient concern for underlying psychiatric disorder  pending brain MRI to r/o intracranial etiologies

## 2019-10-20 LAB
ANION GAP SERPL CALC-SCNC: 13 MMOL/L — SIGNIFICANT CHANGE UP (ref 5–17)
BUN SERPL-MCNC: 18 MG/DL — SIGNIFICANT CHANGE UP (ref 7–23)
CALCIUM SERPL-MCNC: 9.4 MG/DL — SIGNIFICANT CHANGE UP (ref 8.4–10.5)
CHLORIDE SERPL-SCNC: 104 MMOL/L — SIGNIFICANT CHANGE UP (ref 96–108)
CO2 SERPL-SCNC: 23 MMOL/L — SIGNIFICANT CHANGE UP (ref 22–31)
CREAT SERPL-MCNC: 0.84 MG/DL — SIGNIFICANT CHANGE UP (ref 0.5–1.3)
GLUCOSE SERPL-MCNC: 115 MG/DL — HIGH (ref 70–99)
HCT VFR BLD CALC: 36.1 % — SIGNIFICANT CHANGE UP (ref 34.5–45)
HGB BLD-MCNC: 11.9 G/DL — SIGNIFICANT CHANGE UP (ref 11.5–15.5)
MAGNESIUM SERPL-MCNC: 2.2 MG/DL — SIGNIFICANT CHANGE UP (ref 1.6–2.6)
MCHC RBC-ENTMCNC: 29.8 PG — SIGNIFICANT CHANGE UP (ref 27–34)
MCHC RBC-ENTMCNC: 33 GM/DL — SIGNIFICANT CHANGE UP (ref 32–36)
MCV RBC AUTO: 90.5 FL — SIGNIFICANT CHANGE UP (ref 80–100)
NRBC # BLD: 0 /100 WBCS — SIGNIFICANT CHANGE UP (ref 0–0)
PLATELET # BLD AUTO: 178 K/UL — SIGNIFICANT CHANGE UP (ref 150–400)
POTASSIUM SERPL-MCNC: 3.5 MMOL/L — SIGNIFICANT CHANGE UP (ref 3.5–5.3)
POTASSIUM SERPL-SCNC: 3.5 MMOL/L — SIGNIFICANT CHANGE UP (ref 3.5–5.3)
RBC # BLD: 3.99 M/UL — SIGNIFICANT CHANGE UP (ref 3.8–5.2)
RBC # FLD: 13.5 % — SIGNIFICANT CHANGE UP (ref 10.3–14.5)
SODIUM SERPL-SCNC: 140 MMOL/L — SIGNIFICANT CHANGE UP (ref 135–145)
WBC # BLD: 5.97 K/UL — SIGNIFICANT CHANGE UP (ref 3.8–10.5)
WBC # FLD AUTO: 5.97 K/UL — SIGNIFICANT CHANGE UP (ref 3.8–10.5)

## 2019-10-20 PROCEDURE — 70551 MRI BRAIN STEM W/O DYE: CPT | Mod: 26

## 2019-10-20 PROCEDURE — 99232 SBSQ HOSP IP/OBS MODERATE 35: CPT | Mod: GC

## 2019-10-20 RX ORDER — LOSARTAN POTASSIUM 100 MG/1
25 TABLET, FILM COATED ORAL DAILY
Refills: 0 | Status: DISCONTINUED | OUTPATIENT
Start: 2019-10-20 | End: 2019-10-22

## 2019-10-20 RX ORDER — POTASSIUM CHLORIDE 20 MEQ
40 PACKET (EA) ORAL ONCE
Refills: 0 | Status: COMPLETED | OUTPATIENT
Start: 2019-10-20 | End: 2019-10-20

## 2019-10-20 RX ADMIN — Medication 100 MILLIGRAM(S): at 13:36

## 2019-10-20 RX ADMIN — Medication 40 MILLIEQUIVALENT(S): at 12:44

## 2019-10-20 RX ADMIN — SENNA PLUS 2 TABLET(S): 8.6 TABLET ORAL at 23:02

## 2019-10-20 RX ADMIN — Medication 1 DROP(S): at 23:00

## 2019-10-20 RX ADMIN — ESCITALOPRAM OXALATE 10 MILLIGRAM(S): 10 TABLET, FILM COATED ORAL at 11:27

## 2019-10-20 RX ADMIN — Medication 100 MILLIGRAM(S): at 23:00

## 2019-10-20 RX ADMIN — QUETIAPINE FUMARATE 25 MILLIGRAM(S): 200 TABLET, FILM COATED ORAL at 00:03

## 2019-10-20 RX ADMIN — ATORVASTATIN CALCIUM 10 MILLIGRAM(S): 80 TABLET, FILM COATED ORAL at 23:00

## 2019-10-20 RX ADMIN — SODIUM CHLORIDE 90 MILLILITER(S): 9 INJECTION INTRAMUSCULAR; INTRAVENOUS; SUBCUTANEOUS at 23:23

## 2019-10-20 RX ADMIN — Medication 25 MILLIGRAM(S): at 05:37

## 2019-10-20 RX ADMIN — POLYETHYLENE GLYCOL 3350 17 GRAM(S): 17 POWDER, FOR SOLUTION ORAL at 11:27

## 2019-10-20 RX ADMIN — CEFTRIAXONE 100 MILLIGRAM(S): 500 INJECTION, POWDER, FOR SOLUTION INTRAMUSCULAR; INTRAVENOUS at 03:40

## 2019-10-20 RX ADMIN — QUETIAPINE FUMARATE 25 MILLIGRAM(S): 200 TABLET, FILM COATED ORAL at 23:02

## 2019-10-20 RX ADMIN — LOSARTAN POTASSIUM 25 MILLIGRAM(S): 100 TABLET, FILM COATED ORAL at 12:44

## 2019-10-20 NOTE — PROGRESS NOTE ADULT - SUBJECTIVE AND OBJECTIVE BOX
OVERNIGHT EVENTS:     SUBJECTIVE / INTERVAL HPI: Patient seen and examined at bedside.     VITAL SIGNS:  Vital Signs Last 24 Hrs  T(C): 36.4 (20 Oct 2019 04:46), Max: 36.6 (19 Oct 2019 13:48)  T(F): 97.6 (20 Oct 2019 04:46), Max: 97.9 (19 Oct 2019 13:48)  HR: 88 (20 Oct 2019 04:46) (70 - 90)  BP: 127/76 (20 Oct 2019 04:46) (127/76 - 158/82)  BP(mean): --  RR: 18 (20 Oct 2019 04:46) (18 - 20)  SpO2: 94% (20 Oct 2019 04:46) (92% - 94%)    PHYSICAL EXAM:    General: WDWN  HEENT: NC/AT; PERRL, anicteric sclera; MMM  Neck: supple  Cardiovascular: +S1/S2; RRR  Respiratory: CTA B/L; no W/R/R  Gastrointestinal: soft, NT/ND; +BSx4  Extremities: WWP; no edema, clubbing or cyanosis  Vascular: 2+ radial, DP/PT pulses B/L  Neurological: AAOx3; no focal deficits    MEDICATIONS:  MEDICATIONS  (STANDING):  atorvastatin 10 milliGRAM(s) Oral at bedtime  cefTRIAXone   IVPB 1000 milliGRAM(s) IV Intermittent every 24 hours  docusate sodium 100 milliGRAM(s) Oral at bedtime  escitalopram 10 milliGRAM(s) Oral daily  metoprolol succinate ER 25 milliGRAM(s) Oral daily  polyethylene glycol 3350 17 Gram(s) Oral daily  QUEtiapine 25 milliGRAM(s) Oral at bedtime  senna 2 Tablet(s) Oral at bedtime  sodium chloride 0.9%. 1000 milliLiter(s) (90 mL/Hr) IV Continuous <Continuous>  thiamine 100 milliGRAM(s) Oral every 24 hours    MEDICATIONS  (PRN):  artificial  tears Solution 1 Drop(s) Both EYES every 2 hours PRN Dry Eyes  haloperidol    Injectable 2 milliGRAM(s) IntraMuscular every 6 hours PRN agitation  QUEtiapine 25 milliGRAM(s) Oral every 6 hours PRN agitation      ALLERGIES:  Allergies    No Known Allergies    Intolerances        LABS:                        11.9   5.97  )-----------( 178      ( 20 Oct 2019 05:45 )             36.1     10-20    140  |  104  |  18  ----------------------------<  115<H>  3.5   |  23  |  0.84    Ca    9.4      20 Oct 2019 05:45  Mg     2.2     10-20          CAPILLARY BLOOD GLUCOSE          RADIOLOGY & ADDITIONAL TESTS: Reviewed. OVERNIGHT EVENTS: ARIA    SUBJECTIVE / INTERVAL HPI: Patient seen and examined at bedside. Pt reports feeling improved. She denies both auditory and visual hallucinations. Denies fever, chills, chest pain, abd pain, N/V/D, sob, light headedness, dizziness, or urinary sxs.    VITAL SIGNS:  Vital Signs Last 24 Hrs  T(C): 36.4 (20 Oct 2019 04:46), Max: 36.6 (19 Oct 2019 13:48)  T(F): 97.6 (20 Oct 2019 04:46), Max: 97.9 (19 Oct 2019 13:48)  HR: 88 (20 Oct 2019 04:46) (70 - 90)  BP: 127/76 (20 Oct 2019 04:46) (127/76 - 158/82)  BP(mean): --  RR: 18 (20 Oct 2019 04:46) (18 - 20)  SpO2: 94% (20 Oct 2019 04:46) (92% - 94%)    PHYSICAL EXAM:    General: WDWN  HEENT: NC/AT; PERRL, anicteric sclera; MMM  Neck: supple  Cardiovascular: +S1/S2; RRR  Respiratory: CTA B/L; no W/R/R  Gastrointestinal: soft, NT/ND; +BSx4  Extremities: WWP; no edema, clubbing or cyanosis  Vascular: 2+ radial, DP/PT pulses B/L  Neurological: AAOx3; no focal deficits    MEDICATIONS:  MEDICATIONS  (STANDING):  atorvastatin 10 milliGRAM(s) Oral at bedtime  cefTRIAXone   IVPB 1000 milliGRAM(s) IV Intermittent every 24 hours  docusate sodium 100 milliGRAM(s) Oral at bedtime  escitalopram 10 milliGRAM(s) Oral daily  metoprolol succinate ER 25 milliGRAM(s) Oral daily  polyethylene glycol 3350 17 Gram(s) Oral daily  QUEtiapine 25 milliGRAM(s) Oral at bedtime  senna 2 Tablet(s) Oral at bedtime  sodium chloride 0.9%. 1000 milliLiter(s) (90 mL/Hr) IV Continuous <Continuous>  thiamine 100 milliGRAM(s) Oral every 24 hours    MEDICATIONS  (PRN):  artificial  tears Solution 1 Drop(s) Both EYES every 2 hours PRN Dry Eyes  haloperidol    Injectable 2 milliGRAM(s) IntraMuscular every 6 hours PRN agitation  QUEtiapine 25 milliGRAM(s) Oral every 6 hours PRN agitation      ALLERGIES:  Allergies    No Known Allergies    Intolerances        LABS:                        11.9   5.97  )-----------( 178      ( 20 Oct 2019 05:45 )             36.1     10-20    140  |  104  |  18  ----------------------------<  115<H>  3.5   |  23  |  0.84    Ca    9.4      20 Oct 2019 05:45  Mg     2.2     10-20          CAPILLARY BLOOD GLUCOSE          RADIOLOGY & ADDITIONAL TESTS: Reviewed. OVERNIGHT EVENTS: ARIA    SUBJECTIVE / INTERVAL HPI: Patient seen and examined at bedside. Pt reports feeling improved, denies hearing voices. Denies fever, chills, chest pain, abd pain, N/V/D, sob, light headedness, dizziness, or urinary sxs.    VITAL SIGNS:  Vital Signs Last 24 Hrs  T(C): 36.4 (20 Oct 2019 04:46), Max: 36.6 (19 Oct 2019 13:48)  T(F): 97.6 (20 Oct 2019 04:46), Max: 97.9 (19 Oct 2019 13:48)  HR: 88 (20 Oct 2019 04:46) (70 - 90)  BP: 127/76 (20 Oct 2019 04:46) (127/76 - 158/82)  BP(mean): --  RR: 18 (20 Oct 2019 04:46) (18 - 20)  SpO2: 94% (20 Oct 2019 04:46) (92% - 94%)    PHYSICAL EXAM:    General: WDWN  HEENT: NC/AT; PERRL, anicteric sclera; MMM  Neck: supple  Cardiovascular: +S1/S2; RRR  Respiratory: CTA B/L; no W/R/R  Gastrointestinal: soft, NT/ND; +BSx4  Extremities: WWP; no edema, clubbing or cyanosis  Vascular: 2+ radial, DP/PT pulses B/L  Neurological: AAOx3; no focal deficits    MEDICATIONS:  MEDICATIONS  (STANDING):  atorvastatin 10 milliGRAM(s) Oral at bedtime  cefTRIAXone   IVPB 1000 milliGRAM(s) IV Intermittent every 24 hours  docusate sodium 100 milliGRAM(s) Oral at bedtime  escitalopram 10 milliGRAM(s) Oral daily  metoprolol succinate ER 25 milliGRAM(s) Oral daily  polyethylene glycol 3350 17 Gram(s) Oral daily  QUEtiapine 25 milliGRAM(s) Oral at bedtime  senna 2 Tablet(s) Oral at bedtime  sodium chloride 0.9%. 1000 milliLiter(s) (90 mL/Hr) IV Continuous <Continuous>  thiamine 100 milliGRAM(s) Oral every 24 hours    MEDICATIONS  (PRN):  artificial  tears Solution 1 Drop(s) Both EYES every 2 hours PRN Dry Eyes  haloperidol    Injectable 2 milliGRAM(s) IntraMuscular every 6 hours PRN agitation  QUEtiapine 25 milliGRAM(s) Oral every 6 hours PRN agitation      ALLERGIES:  Allergies    No Known Allergies    Intolerances        LABS:                        11.9   5.97  )-----------( 178      ( 20 Oct 2019 05:45 )             36.1     10-20    140  |  104  |  18  ----------------------------<  115<H>  3.5   |  23  |  0.84    Ca    9.4      20 Oct 2019 05:45  Mg     2.2     10-20          CAPILLARY BLOOD GLUCOSE          RADIOLOGY & ADDITIONAL TESTS: Reviewed.

## 2019-10-20 NOTE — PROGRESS NOTE ADULT - PROBLEM SELECTOR PLAN 2
- Patient not meeting SIRS criteria  - UA [(+) ketones, SG 1.030, (+) Nitrites, Trace LE, Many WBC]  - In the setting of AMS, will opt to treat patient for period of 5 days  - C/w Ceftriaxone on 4/5 days, urine culture + for >100,000 e.coli. F/u sensitivities for antibiotic as clinically appropriate. - Patient not meeting SIRS criteria  - UA [(+) ketones, SG 1.030, (+) Nitrites, Trace LE, Many WBC]  - In the setting of AMS, will opt to treat patient for period of 5 days  - C/w Ceftriaxone on 3/5 days, urine culture + for >100,000 e.coli. F/u sensitivities for antibiotic as clinically appropriate.

## 2019-10-20 NOTE — PROGRESS NOTE ADULT - PROBLEM SELECTOR PLAN 4
- Patient reports occasional anxiety and uses Xanax .5 mg BID   - will hold xanax for now  - per psych recs, have started pt on standing seroquel 25 mg QHS. Pt has seroquel 25 mg PO q6h prn for agitation and also has haldol 2 mg IM q6h prn if pt refuses seroquel

## 2019-10-20 NOTE — PROGRESS NOTE ADULT - PROBLEM SELECTOR PLAN 1
- Patient with a 4 week history of altered mental status w/ reported confusion and auditory hallucinations.  - TME may be in relation to underlying UTI  - However, patient also with pressured speech, reported auditory hallucinations, and anisocoria.   - Unknown prior history of psychiatric illness - patient previously w/ anxiety/depression   - No firm diagnosis of dementia, but will persist with ruling out organic causes: TSH wnl, RPR neg, B12 wnl, Folate wnl. F/u MRI  - CTH negative for any acute pathology - however, given acute auditory/visual hallucinations and anisocoria, possibility of mass effect. Pt denies other classic signs like persistent headache, tinnitus.   - Continue to monitor mental status with treatment of infection.   - f/u psych recs

## 2019-10-21 LAB
ANION GAP SERPL CALC-SCNC: 13 MMOL/L — SIGNIFICANT CHANGE UP (ref 5–17)
BUN SERPL-MCNC: 21 MG/DL — SIGNIFICANT CHANGE UP (ref 7–23)
CALCIUM SERPL-MCNC: 9 MG/DL — SIGNIFICANT CHANGE UP (ref 8.4–10.5)
CHLORIDE SERPL-SCNC: 106 MMOL/L — SIGNIFICANT CHANGE UP (ref 96–108)
CO2 SERPL-SCNC: 22 MMOL/L — SIGNIFICANT CHANGE UP (ref 22–31)
CREAT SERPL-MCNC: 0.86 MG/DL — SIGNIFICANT CHANGE UP (ref 0.5–1.3)
GLUCOSE SERPL-MCNC: 110 MG/DL — HIGH (ref 70–99)
HCT VFR BLD CALC: 36.5 % — SIGNIFICANT CHANGE UP (ref 34.5–45)
HGB BLD-MCNC: 11.8 G/DL — SIGNIFICANT CHANGE UP (ref 11.5–15.5)
MAGNESIUM SERPL-MCNC: 2.2 MG/DL — SIGNIFICANT CHANGE UP (ref 1.6–2.6)
MCHC RBC-ENTMCNC: 29.9 PG — SIGNIFICANT CHANGE UP (ref 27–34)
MCHC RBC-ENTMCNC: 32.3 GM/DL — SIGNIFICANT CHANGE UP (ref 32–36)
MCV RBC AUTO: 92.6 FL — SIGNIFICANT CHANGE UP (ref 80–100)
NRBC # BLD: 0 /100 WBCS — SIGNIFICANT CHANGE UP (ref 0–0)
PLATELET # BLD AUTO: 175 K/UL — SIGNIFICANT CHANGE UP (ref 150–400)
POTASSIUM SERPL-MCNC: 3.7 MMOL/L — SIGNIFICANT CHANGE UP (ref 3.5–5.3)
POTASSIUM SERPL-SCNC: 3.7 MMOL/L — SIGNIFICANT CHANGE UP (ref 3.5–5.3)
RBC # BLD: 3.94 M/UL — SIGNIFICANT CHANGE UP (ref 3.8–5.2)
RBC # FLD: 13.4 % — SIGNIFICANT CHANGE UP (ref 10.3–14.5)
SODIUM SERPL-SCNC: 141 MMOL/L — SIGNIFICANT CHANGE UP (ref 135–145)
WBC # BLD: 6.43 K/UL — SIGNIFICANT CHANGE UP (ref 3.8–10.5)
WBC # FLD AUTO: 6.43 K/UL — SIGNIFICANT CHANGE UP (ref 3.8–10.5)

## 2019-10-21 PROCEDURE — 99233 SBSQ HOSP IP/OBS HIGH 50: CPT

## 2019-10-21 PROCEDURE — 99233 SBSQ HOSP IP/OBS HIGH 50: CPT | Mod: GC

## 2019-10-21 RX ORDER — POTASSIUM CHLORIDE 20 MEQ
40 PACKET (EA) ORAL ONCE
Refills: 0 | Status: COMPLETED | OUTPATIENT
Start: 2019-10-21 | End: 2019-10-21

## 2019-10-21 RX ADMIN — ESCITALOPRAM OXALATE 10 MILLIGRAM(S): 10 TABLET, FILM COATED ORAL at 11:28

## 2019-10-21 RX ADMIN — Medication 1 DROP(S): at 21:38

## 2019-10-21 RX ADMIN — SODIUM CHLORIDE 90 MILLILITER(S): 9 INJECTION INTRAMUSCULAR; INTRAVENOUS; SUBCUTANEOUS at 11:28

## 2019-10-21 RX ADMIN — QUETIAPINE FUMARATE 25 MILLIGRAM(S): 200 TABLET, FILM COATED ORAL at 21:38

## 2019-10-21 RX ADMIN — Medication 40 MILLIEQUIVALENT(S): at 11:28

## 2019-10-21 RX ADMIN — Medication 100 MILLIGRAM(S): at 14:30

## 2019-10-21 RX ADMIN — LOSARTAN POTASSIUM 25 MILLIGRAM(S): 100 TABLET, FILM COATED ORAL at 06:01

## 2019-10-21 RX ADMIN — ATORVASTATIN CALCIUM 10 MILLIGRAM(S): 80 TABLET, FILM COATED ORAL at 21:37

## 2019-10-21 RX ADMIN — POLYETHYLENE GLYCOL 3350 17 GRAM(S): 17 POWDER, FOR SOLUTION ORAL at 11:28

## 2019-10-21 RX ADMIN — Medication 25 MILLIGRAM(S): at 06:01

## 2019-10-21 RX ADMIN — Medication 100 MILLIGRAM(S): at 21:38

## 2019-10-21 RX ADMIN — SENNA PLUS 2 TABLET(S): 8.6 TABLET ORAL at 21:38

## 2019-10-21 RX ADMIN — CEFTRIAXONE 100 MILLIGRAM(S): 500 INJECTION, POWDER, FOR SOLUTION INTRAMUSCULAR; INTRAVENOUS at 03:00

## 2019-10-21 NOTE — DIETITIAN INITIAL EVALUATION ADULT. - PROBLEM SELECTOR PLAN 1
- Patient with a 4 week history of altered mental status w/ reported confusion and auditory hallucinations.  - TME may be in relation to underlying UTI  - However, patient also with pressured speech, reported auditory hallucinations, and anisocoria.   - Unknown prior history of psychiatric illness - patient previously w/ anxiety/depression   - No firm diagnosis of dementia, but will persist with ruling out organic causes (TSH, RPR, B12, Folate)   - CTH negative for any acute pathology - however, given acute auditory hallucinations and anisocoria, possibility of mass effect, which would be elucidated on MRI? Patient denying other classic signs like persistent headache, tinnitus, etc.   - Continue to monitor mental status with treatment of infection.   - Obtain collateral from daughter regarding her baseline mental status and any psych conditions.   - Consider psych consult.

## 2019-10-21 NOTE — PROGRESS NOTE BEHAVIORAL HEALTH - NSBHFUPINTERVALCCFT_PSY_A_CORE
"I feel much better."
80yo Female, with medical hx of HLD and HTN, likely psychiatric hx of depression and/or anxiety/panic disorder (based on rx with SSRI and Xanax), now admitted for progressive confusion and hallucinations, found to have UTI.  Pt with episode of increased agitation, paranoia, distress overnight, leading to Code Grey, rx with IM Haldol and Ativan, as well as initiation of E.S.  Pt currently calm, reasonably oriented, still with some vague report of AH (hearing music even after it's been played). No SI/HI.  Given above, pt with likely delirium superimposed on possible cognitive deficits i.e. MCI or dementia.

## 2019-10-21 NOTE — PROGRESS NOTE BEHAVIORAL HEALTH - NSBHCONSULTRECOMMENDOTHER_PSY_A_CORE FT
Data expansion via collaterals--pt's daughters and PMD.
Data expansion via collaterals--pt's daughters and PMD.

## 2019-10-21 NOTE — PROGRESS NOTE ADULT - ASSESSMENT
78yo Female, PMHx HLD - presenting accompanied by daughter who called EMS for progressive confusion and hallucinations. Patient admitted for TME 2/2 UTI.
78yo Female, PMHx HLD - presenting accompanied by daughter who called EMS for progressive confusion and hallucinations. Patient admitted for TME 2/2 UTI.
79 year old female patient with hyperlipidemia and likely psychiatric condition is admitted for progressive confusion and hallucinations is currently being treated for AMS and UTI.
per Internal Medicine    78yo Female, PMHx HLD - presenting accompanied by daughter who called EMS for progressive confusion and hallucinations. Patient admitted for TME 2/2 UTI.     Problem/Plan - 1:  ·  Problem: Toxic metabolic encephalopathy.  Plan: - Patient with a 4 week history of altered mental status w/ reported confusion and auditory hallucinations.  - TME may be in relation to underlying UTI  - However, patient also with pressured speech, reported auditory hallucinations, and anisocoria.   - Unknown prior history of psychiatric illness - patient previously w/ anxiety/depression   - No firm diagnosis of dementia, but will persist with ruling out organic causes: TSH wnl, RPR neg, B12 wnl, Folate wnl   - CTH negative for any acute pathology - however, given acute auditory/visual hallucinations and anisocoria, possibility of mass effect. Pt denies other classic signs like persistent headache, tinnitus.   - F/u MRI  - Continue to monitor mental status with treatment of infection.   - f/u psych recs.     Problem/Plan - 2:  ·  Problem: UTI (urinary tract infection).  Plan: - Patient not meeting SIRS criteria at this time  - UA [(+) ketones, SG 1.030, (+) Nitrites, Trace LE, Many WBC]  - In the setting of AMS, will opt to treat patient for period of 5 days  - C/w Ceftriaxone, urine culture + for >100,000 e.coli. F/u sensitivities for antibiotic as clinically appropriate.     Problem/Plan - 3:  ·  Problem: EDI (acute kidney injury).  Plan: - Resolved  - Patient presented with elevated BUN/Cr - 42/1.51 - w/ an unknown baseline  - Patient reported decreased PO intake of late, which is supported by ketonuria and elevated SG on UA, 1.030.   - Likely a pre-renal etiology for current EDI.     Problem/Plan - 4:  ·  Problem: Anxiety.  Plan: - Patient reports occasional anxiety and uses Xanax .5 mg BID   - will hold xanax for now  - per psych recs, pt has seroquel 25 mg PO q6h prn for agitation and also has haldol 2 mg IM q6h prn if pt refuses seroquel.     Problem/Plan - 5:  ·  Problem: Depression.  Plan: - continuing pt's home lexapro 10 mg QD.     Problem/Plan - 6:  Problem: Hypertension. Plan: - pt w/ hx of HTN  - on home losartan 25 mg QD and metoprolol succinate 25 mg QD  - held losartan in setting of EDI, have continued metoprolol succ 25 mg QD.    Problem/Plan - 7:  ·  Problem: Mitral valve prolapse.     Problem/Plan - 8:  ·  Problem: Nutrition, metabolism, and development symptoms.  Plan: F: NS at 90cc/hr.   E: Replete PRN  N: Regular  Prophylaxis: None  Dispo: [facility].
78yo Female, PMHx HLD - presenting accompanied by daughter who called EMS for progressive confusion and hallucinations. Patient admitted for TME 2/2 UTI.

## 2019-10-21 NOTE — PROGRESS NOTE ADULT - SUBJECTIVE AND OBJECTIVE BOX
OVERNIGHT EVENTS: ARIA    SUBJECTIVE / INTERVAL HPI: Patient seen and examined at bedside. Pt continues to states she feels improve. States she is no longer hearing voices. Denies fever, chills, abd pain, urinary symptoms, N/V/D, chest pain or SOB. No HA, light headedness, or dizziness.    VITAL SIGNS:  Vital Signs Last 24 Hrs  T(C): 36.8 (21 Oct 2019 05:57), Max: 36.8 (20 Oct 2019 21:32)  T(F): 98.2 (21 Oct 2019 05:57), Max: 98.3 (20 Oct 2019 21:32)  HR: 66 (21 Oct 2019 05:57) (66 - 72)  BP: 114/63 (21 Oct 2019 05:57) (114/63 - 133/67)  BP(mean): --  RR: 18 (21 Oct 2019 05:57) (18 - 20)  SpO2: 96% (21 Oct 2019 05:57) (95% - 96%)    PHYSICAL EXAM:    General: WDWN, resting in bed, NAD  HEENT: NC/AT; PERRL, anisocoria right pupil 2 mm, left pupil 5 mm, anicteric sclera; MMM  Neck: supple  Cardiovascular: +S1/S2; RRR  Respiratory: CTA B/L; no W/R/R  Gastrointestinal: soft, NT/ND; +BSx4  Extremities: WWP; no edema, clubbing or cyanosis  Vascular: 2+ radial, DP/PT pulses B/L  Neurological: AAOx3; no focal deficits, CN II-XII intact    MEDICATIONS:  MEDICATIONS  (STANDING):  atorvastatin 10 milliGRAM(s) Oral at bedtime  cefTRIAXone   IVPB 1000 milliGRAM(s) IV Intermittent every 24 hours  docusate sodium 100 milliGRAM(s) Oral at bedtime  escitalopram 10 milliGRAM(s) Oral daily  losartan 25 milliGRAM(s) Oral daily  metoprolol succinate ER 25 milliGRAM(s) Oral daily  polyethylene glycol 3350 17 Gram(s) Oral daily  QUEtiapine 25 milliGRAM(s) Oral at bedtime  senna 2 Tablet(s) Oral at bedtime  sodium chloride 0.9%. 1000 milliLiter(s) (90 mL/Hr) IV Continuous <Continuous>  thiamine 100 milliGRAM(s) Oral every 24 hours    MEDICATIONS  (PRN):  artificial  tears Solution 1 Drop(s) Both EYES every 2 hours PRN Dry Eyes  haloperidol    Injectable 2 milliGRAM(s) IntraMuscular every 6 hours PRN agitation  QUEtiapine 25 milliGRAM(s) Oral every 6 hours PRN agitation      ALLERGIES:  Allergies    No Known Allergies    Intolerances        LABS:                        11.8   6.43  )-----------( 175      ( 21 Oct 2019 06:42 )             36.5     10-21    141  |  106  |  21  ----------------------------<  110<H>  3.7   |  22  |  0.86    Ca    9.0      21 Oct 2019 06:42  Mg     2.2     10-21          CAPILLARY BLOOD GLUCOSE          RADIOLOGY & ADDITIONAL TESTS: Reviewed.

## 2019-10-21 NOTE — DIETITIAN INITIAL EVALUATION ADULT. - ENERGY NEEDS
Ht:5ft 3inches,IBW:115lbs +/-10%,128% of IBW.BMI:26.1.Adjusted for age as per [facility] standards

## 2019-10-21 NOTE — PROGRESS NOTE ADULT - PROBLEM SELECTOR PLAN 5
- continuing pt's home lexapro 10 mg QD
- continuing pt's home lexapro 10 mg QD
- pt w/ hx of HTN  - on home losartan 25 mg QD and metoprolol succinate 25 mg QD  - held losartan in setting of EDI, have continued metoprolol succ 25 mg QD  - patient sytolic over 140 and EDI resolved restart losartan
- continuing pt's home lexapro 10 mg QD

## 2019-10-21 NOTE — PROGRESS NOTE BEHAVIORAL HEALTH - SUMMARY
78yo Female, with medical hx of HLD and HTN, likely psychiatric hx of depression and/or anxiety/panic disorder (based on rx with SSRI and Xanax), now admitted for progressive confusion and hallucinations, found to have UTI.  Pt with episode of increased agitation, paranoia, distress overnight, leading to Code Grey, rx with IM Haldol and Ativan, as well as initiation of E.S.  Pt currently calm, reasonably oriented, still with some vague report of AH (hearing music even after it's been played). No SI/HI.  Given above, pt with likely delirium superimposed on possible cognitive deficits i.e. MCI or dementia.    For now:    --Continue E.S. with sitter.  --Continue Lexapro.  --Avoid prn Xanax, Ambien, Benadryl/other anticholinergic rx.  --Monitor VSS for possible benzo WD. If pt suspected to be in WD, would rx with lorazepam rather than alprazolam, to avoid build up of alprazolam's active metabolites.  --Recommend starting seroquel 25 mg qhs. Can continue prn Seroquel 0.25-0.50 mg po q4-6 hours prn extreme agitation unresponsive to verbal redirection, Haldol 2mg IM q 4-6 hours if pt refuses po.    --**Please obtain EKG (serial EKG's if pt on frequent prns/standing antipsychotic rx), to monitor for possible QTC prolongation due to antipsychotic rx.
78yo Female, with medical hx of HLD and HTN, likely psychiatric hx of depression and/or anxiety/panic disorder (based on rx with SSRI and Xanax), admitted for progressive confusion and hallucinations, found to have UTI. At time of admission, pt endorsing AH of music.  Pt with episode of increased agitation, paranoia, distress overnight on 10/16-10/17, leading to Code Grey, rx with IM Haldol and Ativan, as well as initiation of E.S.  Pt placed on standing Seroquel 25 mg po qhs on 10/18, with + effect.    Pt currently calm, fully oriented, now denies AH. No SI/HI.  Given above, pt with likely resolving delirium superimposed on possible cognitive deficits i.e. MCI or dementia.    For now:    --Continue E.S. with sitter.  --Continue Lexapro and standing Seroquel.  --Avoid prn Xanax, Ambien, Benadryl/other anticholinergic rx.  --Continue prn Seroquel 0.25-0.50 mg po q4-6 hours prn extreme agitation unresponsive to verbal redirection, Haldol 2mg IM q 4-6 hours if pt refuses po.    --**Please obtain EKG (serial EKG's if pt on frequent prns/standing antipsychotic rx), to monitor for possible QTC prolongation due to antipsychotic rx.

## 2019-10-21 NOTE — PROGRESS NOTE ADULT - PROBLEM SELECTOR PLAN 8
F: NS at 90cc/hr.   E: Replete PRN  N: Regular  Prophylaxis: None  Dispo: [facility]
F: none  E: Replete PRN  N: Regular  Prophylaxis: None  Dispo: [facility]
F: NS at 90cc/hr.   E: Replete PRN  N: Regular  Prophylaxis: None  Dispo: [facility]

## 2019-10-21 NOTE — DIETITIAN INITIAL EVALUATION ADULT. - PROBLEM SELECTOR PLAN 4
- Patient reports occasional anxiety and uses Xanax (unknown dose) every other day.   - Med Rec required.

## 2019-10-21 NOTE — PROGRESS NOTE BEHAVIORAL HEALTH - NSBHCHARTREVIEWLAB_PSY_A_CORE FT
11.8   6.43  )-----------( 175      ( 21 Oct 2019 06:42 )             36.5     10-21    141  |  106  |  21  ----------------------------<  110<H>  3.7   |  22  |  0.86    Ca    9.0      21 Oct 2019 06:42  Mg     2.2     10-21

## 2019-10-21 NOTE — PROGRESS NOTE BEHAVIORAL HEALTH - NSBHFUPINTERVALHXFT_PSY_A_CORE
10/18/19: Pt calm and happy but had no idea she is in a hospital, confusing her daughter for her sister, and asking for discharge. "Why does my sister get to leave?" She does say AH/musical hallucinations have decreased. Spoke with team about starting seroquel 25 mg qhs standing.  10/21/19: Pt calm, engageable, receptive to interview. Denies c/o, reports she will be going home tomorrow (?). Denies suicidal/homicidal ideation/intent/plan. Currently denies any AH of music, stating, "That has gone away."   Pt tolerating Seroquel 25 mg po qhs with no evidence/complaint of SE. 10/18/19: Pt calm and happy but had no idea she is in a hospital, confusing her daughter for her sister, and asking for discharge. "Why does my sister get to leave?" She does say AH/musical hallucinations have decreased. Spoke with team about starting seroquel 25 mg qhs standing.  10/21/19: Pt calm, engageable, receptive to interview. Denies c/o, reports she will be going home tomorrow (?). Denies suicidal/homicidal ideation/intent/plan. Currently denies any AH of music, stating, "That has gone away."   Pt compliant with rx and medical care. She is tolerating Seroquel 25 mg po qhs with no evidence/complaint of SE. she received additional prn Seroquel 25 mg overnight on 10/18 and 10/19, but no prns required since that time.

## 2019-10-21 NOTE — PROGRESS NOTE ADULT - PROBLEM SELECTOR PLAN 6
- pt w/ hx of HTN  - on home losartan 25 mg QD and metoprolol succinate 25 mg QD  - have continued metoprolol succ 25 mg QD  - EDI resolved, will restart losartan
- pt w/ hx of HTN  - on home losartan 25 mg QD and metoprolol succinate 25 mg QD  - held losartan in setting of EDI, have continued metoprolol succ 25 mg QD
F: NS at 90cc/hr.   E: Replete PRN  N: Regular  Prophylaxis: None  Dispo: RMF
- pt w/ hx of HTN  - on home losartan 25 mg QD and metoprolol succinate 25 mg QD  - have continued metoprolol succ 25 mg QD  - EDI resolved, will restart losartan

## 2019-10-21 NOTE — PROGRESS NOTE ADULT - PROBLEM SELECTOR PLAN 3
- Resolved  - Patient presented with elevated BUN/Cr - 42/1.51 - w/ an unknown baseline  - Patient reported decreased PO intake of late, which is supported by ketonuria and elevated SG on UA, 1.030.   - Likely a pre-renal etiology for current EDI
- Resolved  - Patient presented with elevated BUN/Cr - 42/1.51 - w/ an unknown baseline  - Patient reported decreased PO intake of late, which is supported by ketonuria and elevated SG on UA, 1.030.   - Likely a pre-renal etiology for current EDI
- per psych recs, have started pt on standing seroquel 25 mg QHS. Pt has seroquel 25 mg PO q6h prn for agitation and also has haldol 2 mg IM q6h prn if pt refuses seroquel
- Resolved  - Patient presented with elevated BUN/Cr - 42/1.51 - w/ an unknown baseline  - Patient reported decreased PO intake of late, which is supported by ketonuria and elevated SG on UA, 1.030.   - Likely a pre-renal etiology for current EDI

## 2019-10-21 NOTE — PROGRESS NOTE BEHAVIORAL HEALTH - NSBHADMITCOUNSEL_PSY_A_CORE
instructions for management, treatment and follow up/other...
instructions for management, treatment and follow up/other...

## 2019-10-21 NOTE — PROGRESS NOTE ADULT - PROBLEM SELECTOR PROBLEM 7
Mitral valve prolapse
Mitral valve prolapse
Transition of care performed with sharing of clinical summary
Mitral valve prolapse

## 2019-10-21 NOTE — PROGRESS NOTE BEHAVIORAL HEALTH - RISK ASSESSMENT
Per pt (poor historian), she has never had suicidal ideation/intent/plan/attempt, and denies now. However, need collateral info to confirm this.
Per pt (poor historian), she has never had suicidal ideation/intent/plan/attempt, and denies now. However, need collateral info to confirm this.

## 2019-10-21 NOTE — PROGRESS NOTE BEHAVIORAL HEALTH - NSBHCHARTREVIEWVS_PSY_A_CORE FT
ICU Vital Signs Last 24 Hrs  T(C): 36.8 (21 Oct 2019 21:12), Max: 37 (21 Oct 2019 14:06)  T(F): 98.3 (21 Oct 2019 21:12), Max: 98.6 (21 Oct 2019 14:06)  HR: 68 (21 Oct 2019 21:12) (66 - 75)  BP: 166/80 (21 Oct 2019 21:12) (114/63 - 166/80)  BP(mean): --  ABP: --  ABP(mean): --  RR: 20 (21 Oct 2019 21:12) (16 - 20)  SpO2: 97% (21 Oct 2019 21:12) (96% - 97%)

## 2019-10-21 NOTE — DIETITIAN INITIAL EVALUATION ADULT. - PROBLEM SELECTOR PLAN 6
1) PCP Contacted on Admission: (Y/N) --> Name & Phone #: Jossue Hardin   2) Date of Contact with PCP:  3) PCP Contacted at Discharge: (Y/N)  4) Summary of Handoff Given to PCP:   5) Post-Discharge Appointment Date and Location:

## 2019-10-21 NOTE — DIETITIAN INITIAL EVALUATION ADULT. - OTHER INFO
80y/o female with history of HTN/HLD admitted with confusion and UTI. Observed eating 100% without N/V/D or pain. SKin intact. Noted report of recent increased ETOH intake. Unable to quantify diet PTA

## 2019-10-21 NOTE — PROGRESS NOTE BEHAVIORAL HEALTH - NSBHADMITCOUNSELOTHER_PSY_A_CORE FT
Provided orientation measures with patient and discussed discharge plan with team. Patient to stay over the weekend.
Provided orientation measures with patient and discussed discharge plan with team. Patient to stay over the weekend.

## 2019-10-21 NOTE — PROGRESS NOTE ADULT - PROBLEM SELECTOR PLAN 1
- Patient with a 4 week history of altered mental status w/ reported confusion and auditory hallucinations.  - TME may be in relation to underlying UTI or xanax use  - However, patient also with pressured speech, reported auditory hallucinations, and anisocoria.   - Unknown prior history of psychiatric illness - patient previously w/ anxiety/depression   - No firm diagnosis of dementia, have ruled out possible organic causes: TSH wnl, RPR neg, B12 wnl, Folate wnl. F/u MRI  - CTH negative for any acute pathology, However, given acute auditory/visual hallucinations and anisocoria, concern for possibility of mass effect. Pt denies other classic signs like persistent headache, tinnitus. MRI head with no acute infarcts, chronic infarcts, some volume loss.   - Continue to monitor mental status with treatment of infection.   - f/u psych recs - Improved with treatment of UTI.   - MRI head with no acute infarcts, chronic infarcts, some volume loss.   - Continue to monitor mental status with treatment of infection.   - f/u psych recs

## 2019-10-21 NOTE — PROGRESS NOTE ADULT - PROBLEM SELECTOR PLAN 2
- Patient not meeting SIRS criteria  - UA [(+) ketones, SG 1.030, (+) Nitrites, Trace LE, Many WBC]  - In the setting of AMS, will opt to treat patient for period of 5 days  - Ceftriaxone on 5/5 days, urine culture + for >100,000 e.coli.

## 2019-10-22 ENCOUNTER — TRANSCRIPTION ENCOUNTER (OUTPATIENT)
Age: 79
End: 2019-10-22

## 2019-10-22 VITALS
OXYGEN SATURATION: 94 % | SYSTOLIC BLOOD PRESSURE: 119 MMHG | HEART RATE: 62 BPM | RESPIRATION RATE: 16 BRPM | TEMPERATURE: 99 F | DIASTOLIC BLOOD PRESSURE: 64 MMHG

## 2019-10-22 PROCEDURE — 99239 HOSP IP/OBS DSCHRG MGMT >30: CPT

## 2019-10-22 RX ORDER — ALPRAZOLAM 0.25 MG
0.5 TABLET ORAL
Qty: 0 | Refills: 0 | DISCHARGE

## 2019-10-22 RX ORDER — QUETIAPINE FUMARATE 200 MG/1
1 TABLET, FILM COATED ORAL
Qty: 30 | Refills: 0
Start: 2019-10-22 | End: 2019-11-20

## 2019-10-22 RX ADMIN — Medication 100 MILLIGRAM(S): at 12:17

## 2019-10-22 RX ADMIN — Medication 25 MILLIGRAM(S): at 06:15

## 2019-10-22 RX ADMIN — ESCITALOPRAM OXALATE 10 MILLIGRAM(S): 10 TABLET, FILM COATED ORAL at 12:15

## 2019-10-22 RX ADMIN — LOSARTAN POTASSIUM 25 MILLIGRAM(S): 100 TABLET, FILM COATED ORAL at 06:15

## 2019-10-22 NOTE — DISCHARGE NOTE NURSING/CASE MANAGEMENT/SOCIAL WORK - PATIENT PORTAL LINK FT
You can access the FollowMyHealth Patient Portal offered by [facility] by registering at the following website: [email or web address]. By joining [facility]’s FollowMyHealth portal, you will also be able to view your health information using other applications (apps) compatible with our system.

## 2019-10-24 DIAGNOSIS — E78.00 PURE HYPERCHOLESTEROLEMIA, UNSPECIFIED: ICD-10-CM

## 2019-10-24 DIAGNOSIS — F32.9 MAJOR DEPRESSIVE DISORDER, SINGLE EPISODE, UNSPECIFIED: ICD-10-CM

## 2019-10-24 DIAGNOSIS — G92 TOXIC ENCEPHALOPATHY: ICD-10-CM

## 2019-10-24 DIAGNOSIS — T42.4X5A ADVERSE EFFECT OF BENZODIAZEPINES, INITIAL ENCOUNTER: ICD-10-CM

## 2019-10-24 DIAGNOSIS — R41.82 ALTERED MENTAL STATUS, UNSPECIFIED: ICD-10-CM

## 2019-10-24 DIAGNOSIS — N39.0 URINARY TRACT INFECTION, SITE NOT SPECIFIED: ICD-10-CM

## 2019-10-24 DIAGNOSIS — F41.9 ANXIETY DISORDER, UNSPECIFIED: ICD-10-CM

## 2019-10-24 DIAGNOSIS — F01.50 VASCULAR DEMENTIA WITHOUT BEHAVIORAL DISTURBANCE: ICD-10-CM

## 2019-10-24 DIAGNOSIS — I10 ESSENTIAL (PRIMARY) HYPERTENSION: ICD-10-CM

## 2019-10-24 DIAGNOSIS — I34.1 NONRHEUMATIC MITRAL (VALVE) PROLAPSE: ICD-10-CM

## 2019-10-24 DIAGNOSIS — N17.9 ACUTE KIDNEY FAILURE, UNSPECIFIED: ICD-10-CM

## 2019-11-01 LAB
APPEARANCE: CLEAR
BACTERIA UR CULT: NORMAL
BACTERIA: NEGATIVE
BILIRUBIN URINE: NEGATIVE
BLOOD URINE: NEGATIVE
COLOR: YELLOW
GLUCOSE QUALITATIVE U: NEGATIVE
HYALINE CASTS: 0 /LPF
KETONES URINE: NEGATIVE
LEUKOCYTE ESTERASE URINE: ABNORMAL
MICROSCOPIC-UA: NORMAL
NITRITE URINE: NEGATIVE
PH URINE: 6
PROTEIN URINE: NEGATIVE
RED BLOOD CELLS URINE: 1 /HPF
SPECIFIC GRAVITY URINE: 1.02
SQUAMOUS EPITHELIAL CELLS: 2 /HPF
URINE COMMENTS: NORMAL
UROBILINOGEN URINE: NORMAL
WHITE BLOOD CELLS URINE: 1 /HPF

## 2019-11-12 ENCOUNTER — APPOINTMENT (OUTPATIENT)
Dept: HEART AND VASCULAR | Facility: CLINIC | Age: 79
End: 2019-11-12
Payer: MEDICARE

## 2019-11-12 DIAGNOSIS — N81.10 CYSTOCELE, UNSPECIFIED: ICD-10-CM

## 2019-11-12 DIAGNOSIS — N39.0 URINARY TRACT INFECTION, SITE NOT SPECIFIED: ICD-10-CM

## 2019-11-12 PROCEDURE — 90686 IIV4 VACC NO PRSV 0.5 ML IM: CPT

## 2019-11-12 PROCEDURE — 85730 THROMBOPLASTIN TIME PARTIAL: CPT

## 2019-11-12 PROCEDURE — G0008: CPT

## 2019-11-12 PROCEDURE — 82962 GLUCOSE BLOOD TEST: CPT

## 2019-11-12 PROCEDURE — 93005 ELECTROCARDIOGRAM TRACING: CPT

## 2019-11-12 PROCEDURE — 80053 COMPREHEN METABOLIC PANEL: CPT

## 2019-11-12 PROCEDURE — 82607 VITAMIN B-12: CPT

## 2019-11-12 PROCEDURE — 80048 BASIC METABOLIC PNL TOTAL CA: CPT

## 2019-11-12 PROCEDURE — 80307 DRUG TEST PRSMV CHEM ANLYZR: CPT

## 2019-11-12 PROCEDURE — 70450 CT HEAD/BRAIN W/O DYE: CPT

## 2019-11-12 PROCEDURE — 84443 ASSAY THYROID STIM HORMONE: CPT

## 2019-11-12 PROCEDURE — 97161 PT EVAL LOW COMPLEX 20 MIN: CPT

## 2019-11-12 PROCEDURE — 85027 COMPLETE CBC AUTOMATED: CPT

## 2019-11-12 PROCEDURE — 71045 X-RAY EXAM CHEST 1 VIEW: CPT

## 2019-11-12 PROCEDURE — 87186 SC STD MICRODIL/AGAR DIL: CPT

## 2019-11-12 PROCEDURE — 36415 COLL VENOUS BLD VENIPUNCTURE: CPT

## 2019-11-12 PROCEDURE — 99285 EMERGENCY DEPT VISIT HI MDM: CPT | Mod: 25

## 2019-11-12 PROCEDURE — 96374 THER/PROPH/DIAG INJ IV PUSH: CPT

## 2019-11-12 PROCEDURE — 70551 MRI BRAIN STEM W/O DYE: CPT

## 2019-11-12 PROCEDURE — 81001 URINALYSIS AUTO W/SCOPE: CPT

## 2019-11-12 PROCEDURE — 86780 TREPONEMA PALLIDUM: CPT

## 2019-11-12 PROCEDURE — 82746 ASSAY OF FOLIC ACID SERUM: CPT

## 2019-11-12 PROCEDURE — 84484 ASSAY OF TROPONIN QUANT: CPT

## 2019-11-12 PROCEDURE — 85025 COMPLETE CBC W/AUTO DIFF WBC: CPT

## 2019-11-12 PROCEDURE — 83735 ASSAY OF MAGNESIUM: CPT

## 2019-11-12 PROCEDURE — 85610 PROTHROMBIN TIME: CPT

## 2019-11-12 PROCEDURE — 87086 URINE CULTURE/COLONY COUNT: CPT

## 2019-11-13 LAB
APPEARANCE: CLEAR
APPEARANCE: CLEAR
BACTERIA: NEGATIVE
BILIRUBIN URINE: NEGATIVE
BILIRUBIN URINE: NEGATIVE
BLOOD URINE: NEGATIVE
BLOOD URINE: NEGATIVE
COLOR: YELLOW
COLOR: YELLOW
GLUCOSE QUALITATIVE U: NEGATIVE
GLUCOSE QUALITATIVE U: NEGATIVE
HYALINE CASTS: 1 /LPF
KETONES URINE: NEGATIVE
KETONES URINE: NEGATIVE
LEUKOCYTE ESTERASE URINE: NEGATIVE
LEUKOCYTE ESTERASE URINE: NEGATIVE
MICROSCOPIC-UA: NORMAL
NITRITE URINE: NEGATIVE
NITRITE URINE: NEGATIVE
PH URINE: 5
PH URINE: 5
PROTEIN URINE: NEGATIVE
PROTEIN URINE: NEGATIVE
RED BLOOD CELLS URINE: 3 /HPF
SPECIFIC GRAVITY URINE: 1.02
SPECIFIC GRAVITY URINE: 1.02
SQUAMOUS EPITHELIAL CELLS: 5 /HPF
UROBILINOGEN URINE: NORMAL
UROBILINOGEN URINE: NORMAL
WHITE BLOOD CELLS URINE: 3 /HPF

## 2019-11-14 ENCOUNTER — MED ADMIN CHARGE (OUTPATIENT)
Age: 79
End: 2019-11-14

## 2019-11-20 ENCOUNTER — TRANSCRIPTION ENCOUNTER (OUTPATIENT)
Age: 79
End: 2019-11-20

## 2019-11-21 NOTE — DISCUSSION/SUMMARY
[Moderate Mitral Regurgitation] : moderate mitral regurgitation [Severe Mitral Regurgitation] : severe mitral regurgitation [Compensated] : compensated [Outpatient Evaluation] : outpatient evaluation [BNP] : B-type natriuretic peptide [___ Month(s)] : [unfilled] month(s) [With Me] : with me [FreeTextEntry1] : Gynecology referral to manage bladder prolapse - resize for pessary \par \par Continue Quetiapine 25 mg po q hs \par \par will set up stress echo in January \par \par HD fluzone administered

## 2019-11-21 NOTE — REASON FOR VISIT
[Follow-Up - From Hospitalization] : follow-up of a recent hospitalization for [Hypertension] : hypertension [Mitral Regurgitation] : mitral regurgitation [FreeTextEntry2] : s/p UTI c/b altered mental status  [FreeTextEntry1] : 79 year old anxious   with hx of hyperlipidemia and mild hypertension presents for follow up after hospitalization for  UTI complicated by altered mental status . \par \par She is anxious and depressed but not suicidal. \par \par Has documented  moderately severe mitral regurgitation  without CHF\par \par She wears hearing aides \par \par \par

## 2019-11-21 NOTE — REVIEW OF SYSTEMS
[Recent Weight Gain (___ Lbs)] : no recent weight gain [Recent Weight Loss (___ Lbs)] : recent [unfilled] ~Ulb weight loss [Loss Of Hearing] : hearing loss [Shortness Of Breath] : no shortness of breath [Palpitations] : no palpitations [Dysuria] : no dysuria [Pelvic Pain] : no pelvic pain [Incontinence] : no incontinence [Dysmenorrhea] : no dysmenorrhea [Vaginal Discharge] : no vaginal discharge [Abn Vaginal Bleeding] : no unexplained vaginal bleeding [Depression] : depression [Anxiety] : anxiety [Suicidal] : not suicidal [Negative] : Heme/Lymph

## 2019-11-21 NOTE — ASSESSMENT
[FreeTextEntry1] : stable hemodynamics\par \par anxiety/depression improved\par \par UTI resolved \par \par bladder prolapse

## 2019-11-21 NOTE — HISTORY OF PRESENT ILLNESS
[FreeTextEntry1] : hx of bladder prolapse ,  she has not been wearing previously prescribed pessary . Denies hematuria , dysuria, urgency at this time.   In the hospital, she was treated for UTI  and her mental status cleared up . \par \par She denies fevers, chills, night sweats, urinary incontinence \par \par She is not suicidal   and she stopped drinking wine \par \par She  was started on  Quetiapine 25 mg po q hs  which has helped her sleep \par \par MRI in hospital was negative for acute infarcts  and she received thiamine . 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

## 2019-11-21 NOTE — PHYSICAL EXAM
[General Appearance - Well Developed] : well developed [Normal Appearance] : normal appearance [Well Groomed] : well groomed [General Appearance - Well Nourished] : well nourished [No Deformities] : no deformities [General Appearance - In No Acute Distress] : no acute distress [Normal Conjunctiva] : the conjunctiva exhibited no abnormalities [Eyelids - No Xanthelasma] : the eyelids demonstrated no xanthelasmas [No Oral Cyanosis] : no oral cyanosis [Normal Jugular Venous A Waves Present] : normal jugular venous A waves present [Normal Jugular Venous V Waves Present] : normal jugular venous V waves present [No Jugular Venous Amaro A Waves] : no jugular venous amaro A waves [Respiration, Rhythm And Depth] : normal respiratory rhythm and effort [Exaggerated Use Of Accessory Muscles For Inspiration] : no accessory muscle use [Auscultation Breath Sounds / Voice Sounds] : lungs were clear to auscultation bilaterally [Heart Rate And Rhythm] : heart rate and rhythm were normal [Heart Sounds] : normal S1 and S2 [Arterial Pulses Normal] : the arterial pulses were normal [Edema] : no peripheral edema present [Systolic grade ___/6] : A grade [unfilled]/6 systolic murmur was heard. [Abnormal Walk] : normal gait [FreeTextEntry1] : normal capillary refill [Gait - Sufficient For Exercise Testing] : the gait was sufficient for exercise testing [Nail Clubbing] : no clubbing of the fingernails [Cyanosis, Localized] : no localized cyanosis [Petechial Hemorrhages (___cm)] : no petechial hemorrhages [Skin Color & Pigmentation] : normal skin color and pigmentation [Skin Turgor] : normal skin turgor [] : no rash [Oriented To Time, Place, And Person] : oriented to person, place, and time [Impaired Insight] : insight and judgment were intact [Mood] : the mood was normal

## 2019-11-22 ENCOUNTER — TRANSCRIPTION ENCOUNTER (OUTPATIENT)
Age: 79
End: 2019-11-22

## 2019-11-26 ENCOUNTER — RX RENEWAL (OUTPATIENT)
Age: 79
End: 2019-11-26

## 2019-11-26 ENCOUNTER — APPOINTMENT (OUTPATIENT)
Dept: OBGYN | Facility: CLINIC | Age: 79
End: 2019-11-26

## 2019-11-29 ENCOUNTER — TRANSCRIPTION ENCOUNTER (OUTPATIENT)
Age: 79
End: 2019-11-29

## 2019-12-15 ENCOUNTER — RX RENEWAL (OUTPATIENT)
Age: 79
End: 2019-12-15

## 2019-12-15 RX ORDER — AZELASTINE HYDROCHLORIDE 137 UG/1
137 SPRAY, METERED NASAL
Qty: 1 | Refills: 2 | Status: ACTIVE | COMMUNITY
Start: 2019-12-15 | End: 1900-01-01

## 2020-01-06 ENCOUNTER — APPOINTMENT (OUTPATIENT)
Dept: HEART AND VASCULAR | Facility: CLINIC | Age: 80
End: 2020-01-06
Payer: MEDICARE

## 2020-01-06 VITALS
SYSTOLIC BLOOD PRESSURE: 126 MMHG | WEIGHT: 130 LBS | BODY MASS INDEX: 23.03 KG/M2 | HEART RATE: 98 BPM | OXYGEN SATURATION: 93 % | DIASTOLIC BLOOD PRESSURE: 77 MMHG | TEMPERATURE: 97.4 F | RESPIRATION RATE: 14 BRPM

## 2020-01-06 DIAGNOSIS — R22.1 LOCALIZED SWELLING, MASS AND LUMP, NECK: ICD-10-CM

## 2020-01-06 PROCEDURE — 36415 COLL VENOUS BLD VENIPUNCTURE: CPT

## 2020-01-06 PROCEDURE — 99213 OFFICE O/P EST LOW 20 MIN: CPT

## 2020-01-06 NOTE — PHYSICAL EXAM
[General Appearance - Well Developed] : well developed [Normal Appearance] : normal appearance [Well Groomed] : well groomed [General Appearance - Well Nourished] : well nourished [No Deformities] : no deformities [General Appearance - In No Acute Distress] : no acute distress [Normal Conjunctiva] : the conjunctiva exhibited no abnormalities [Eyelids - No Xanthelasma] : the eyelids demonstrated no xanthelasmas [No Oral Cyanosis] : no oral cyanosis [Normal Jugular Venous A Waves Present] : normal jugular venous A waves present [Normal Jugular Venous V Waves Present] : normal jugular venous V waves present [No Jugular Venous Amaro A Waves] : no jugular venous amaro A waves [Respiration, Rhythm And Depth] : normal respiratory rhythm and effort [Exaggerated Use Of Accessory Muscles For Inspiration] : no accessory muscle use [Auscultation Breath Sounds / Voice Sounds] : lungs were clear to auscultation bilaterally [Heart Rate And Rhythm] : heart rate and rhythm were normal [Heart Sounds] : normal S1 and S2 [Arterial Pulses Normal] : the arterial pulses were normal [Edema] : no peripheral edema present [Systolic grade ___/6] : A grade [unfilled]/6 systolic murmur was heard. [FreeTextEntry1] : normal capillary refill [Abnormal Walk] : normal gait [Gait - Sufficient For Exercise Testing] : the gait was sufficient for exercise testing [Nail Clubbing] : no clubbing of the fingernails [Cyanosis, Localized] : no localized cyanosis [Petechial Hemorrhages (___cm)] : no petechial hemorrhages [Skin Color & Pigmentation] : normal skin color and pigmentation [Skin Turgor] : normal skin turgor [] : no rash [Oriented To Time, Place, And Person] : oriented to person, place, and time [Impaired Insight] : insight and judgment were intact [Mood] : the mood was normal

## 2020-01-06 NOTE — DISCUSSION/SUMMARY
[___ Week(s)] : [unfilled] week(s) [With Me] : with me [FreeTextEntry1] : venipuncture performed \par \par \par refer for US guided biopsy

## 2020-01-06 NOTE — REASON FOR VISIT
[Acute Exacerbation] : an acute exacerbation of [Hyperlipidemia] : hyperlipidemia [Hypertension] : hypertension [Mitral Regurgitation] : mitral regurgitation [FreeTextEntry1] : 79 year old anxious   with hx of hyperlipidemia and mild hypertension presents for evaluation of small lump top midline chest \par \par \par She is mildly anxious and depressed but not suicidal. \par \par Has documented  moderately severe mitral regurgitation  without CHF\par \par She wears hearing aides \par \par \par

## 2020-01-06 NOTE — HISTORY OF PRESENT ILLNESS
[FreeTextEntry1] : Presents with small , mobile mass top of sternum ~ 1 week without fevers, discharge , erythema , trauma . Denies fevers, chills, night sweats . \par \par No other adenopathy appreciated\par \par Appetite good,  no weight loss

## 2020-01-06 NOTE — REVIEW OF SYSTEMS
[Recent Weight Gain (___ Lbs)] : recent [unfilled] ~Ulb weight gain [Recent Weight Loss (___ Lbs)] : no recent weight loss [Loss Of Hearing] : hearing loss [Shortness Of Breath] : no shortness of breath [Palpitations] : no palpitations [Dysuria] : no dysuria [Incontinence] : no incontinence [Pelvic Pain] : no pelvic pain [Dysmenorrhea] : no dysmenorrhea [Vaginal Discharge] : no vaginal discharge [Abn Vaginal Bleeding] : no unexplained vaginal bleeding [Depression] : depression [Anxiety] : anxiety [Suicidal] : not suicidal [Swollen Glands] : swollen glands [Negative] : Endocrine

## 2020-01-07 LAB
ALBUMIN SERPL ELPH-MCNC: 4.3 G/DL
ALP BLD-CCNC: 84 U/L
ALT SERPL-CCNC: 19 U/L
ANION GAP SERPL CALC-SCNC: 17 MMOL/L
AST SERPL-CCNC: 22 U/L
BASOPHILS # BLD AUTO: 0.12 K/UL
BASOPHILS NFR BLD AUTO: 1.2 %
BILIRUB SERPL-MCNC: 0.8 MG/DL
BUN SERPL-MCNC: 28 MG/DL
CALCIUM SERPL-MCNC: 10.1 MG/DL
CHLORIDE SERPL-SCNC: 102 MMOL/L
CHOLEST SERPL-MCNC: 254 MG/DL
CHOLEST/HDLC SERPL: 3.1 RATIO
CO2 SERPL-SCNC: 22 MMOL/L
CREAT SERPL-MCNC: 1.23 MG/DL
CREAT SPEC-SCNC: 150 MG/DL
CRP SERPL-MCNC: 0.36 MG/DL
EOSINOPHIL # BLD AUTO: 0.58 K/UL
EOSINOPHIL NFR BLD AUTO: 6 %
ERYTHROCYTE [SEDIMENTATION RATE] IN BLOOD BY WESTERGREN METHOD: 56 MM/HR
ESTIMATED AVERAGE GLUCOSE: 126 MG/DL
GLUCOSE SERPL-MCNC: 129 MG/DL
HBA1C MFR BLD HPLC: 6 %
HCT VFR BLD CALC: 41.1 %
HDLC SERPL-MCNC: 82 MG/DL
HGB BLD-MCNC: 12.2 G/DL
IMM GRANULOCYTES NFR BLD AUTO: 0.4 %
LDLC SERPL CALC-MCNC: 150 MG/DL
LYMPHOCYTES # BLD AUTO: 1.16 K/UL
LYMPHOCYTES NFR BLD AUTO: 12 %
MAN DIFF?: NORMAL
MCHC RBC-ENTMCNC: 29.3 PG
MCHC RBC-ENTMCNC: 29.7 GM/DL
MCV RBC AUTO: 98.6 FL
MICROALBUMIN 24H UR DL<=1MG/L-MCNC: <1.2 MG/DL
MICROALBUMIN/CREAT 24H UR-RTO: NORMAL MG/G
MONOCYTES # BLD AUTO: 0.66 K/UL
MONOCYTES NFR BLD AUTO: 6.8 %
NEUTROPHILS # BLD AUTO: 7.14 K/UL
NEUTROPHILS NFR BLD AUTO: 73.6 %
PLATELET # BLD AUTO: 239 K/UL
POTASSIUM SERPL-SCNC: 4 MMOL/L
PROT SERPL-MCNC: 6.9 G/DL
RBC # BLD: 4.17 M/UL
RBC # FLD: 14.5 %
SODIUM SERPL-SCNC: 141 MMOL/L
TRIGL SERPL-MCNC: 111 MG/DL
TSH SERPL-ACNC: 3.36 UIU/ML
WBC # FLD AUTO: 9.7 K/UL

## 2020-02-06 ENCOUNTER — RX RENEWAL (OUTPATIENT)
Age: 80
End: 2020-02-06

## 2020-02-16 ENCOUNTER — RX RENEWAL (OUTPATIENT)
Age: 80
End: 2020-02-16

## 2020-03-17 ENCOUNTER — RX RENEWAL (OUTPATIENT)
Age: 80
End: 2020-03-17

## 2020-04-14 ENCOUNTER — RX RENEWAL (OUTPATIENT)
Age: 80
End: 2020-04-14

## 2020-04-24 ENCOUNTER — RX RENEWAL (OUTPATIENT)
Age: 80
End: 2020-04-24

## 2020-04-24 ENCOUNTER — APPOINTMENT (OUTPATIENT)
Dept: HEART AND VASCULAR | Facility: CLINIC | Age: 80
End: 2020-04-24
Payer: MEDICARE

## 2020-04-24 PROCEDURE — 99441: CPT | Mod: CR

## 2020-05-13 ENCOUNTER — APPOINTMENT (OUTPATIENT)
Dept: HEART AND VASCULAR | Facility: CLINIC | Age: 80
End: 2020-05-13
Payer: MEDICARE

## 2020-05-13 PROCEDURE — 99441: CPT

## 2020-05-14 ENCOUNTER — APPOINTMENT (OUTPATIENT)
Dept: HEART AND VASCULAR | Facility: CLINIC | Age: 80
End: 2020-05-14

## 2020-05-15 ENCOUNTER — APPOINTMENT (OUTPATIENT)
Dept: HEART AND VASCULAR | Facility: CLINIC | Age: 80
End: 2020-05-15
Payer: MEDICARE

## 2020-05-15 PROCEDURE — 99441: CPT

## 2020-05-20 ENCOUNTER — APPOINTMENT (OUTPATIENT)
Dept: HEART AND VASCULAR | Facility: CLINIC | Age: 80
End: 2020-05-20
Payer: MEDICARE

## 2020-05-20 VITALS
OXYGEN SATURATION: 95 % | DIASTOLIC BLOOD PRESSURE: 84 MMHG | BODY MASS INDEX: 27.47 KG/M2 | WEIGHT: 155.03 LBS | RESPIRATION RATE: 14 BRPM | HEIGHT: 63 IN | HEART RATE: 105 BPM | SYSTOLIC BLOOD PRESSURE: 134 MMHG | TEMPERATURE: 97.7 F

## 2020-05-20 DIAGNOSIS — F41.9 ANXIETY DISORDER, UNSPECIFIED: ICD-10-CM

## 2020-05-20 DIAGNOSIS — I44.0 ATRIOVENTRICULAR BLOCK, FIRST DEGREE: ICD-10-CM

## 2020-05-20 DIAGNOSIS — H92.02 OTALGIA, LEFT EAR: ICD-10-CM

## 2020-05-20 DIAGNOSIS — F32.9 ANXIETY DISORDER, UNSPECIFIED: ICD-10-CM

## 2020-05-20 DIAGNOSIS — R73.03 PREDIABETES.: ICD-10-CM

## 2020-05-20 PROCEDURE — 36415 COLL VENOUS BLD VENIPUNCTURE: CPT

## 2020-05-20 PROCEDURE — 99214 OFFICE O/P EST MOD 30 MIN: CPT

## 2020-05-20 PROCEDURE — 93880 EXTRACRANIAL BILAT STUDY: CPT

## 2020-05-20 RX ORDER — CIPROFLOXACIN HYDROCHLORIDE 500 MG/1
500 TABLET, FILM COATED ORAL TWICE DAILY
Qty: 10 | Refills: 0 | Status: ACTIVE | COMMUNITY
Start: 2020-05-20 | End: 1900-01-01

## 2020-05-20 RX ORDER — CIPROFLOXACIN 0.5 MG/.25ML
0.2 SOLUTION/ DROPS AURICULAR (OTIC) TWICE DAILY
Qty: 1 | Refills: 0 | Status: DISCONTINUED | COMMUNITY
Start: 2020-05-20 | End: 2020-05-20

## 2020-05-20 NOTE — REASON FOR VISIT
[Hyperlipidemia] : hyperlipidemia [Follow-Up - Clinic] : a clinic follow-up of [Hypertension] : hypertension [Mitral Regurgitation] : mitral regurgitation [FreeTextEntry1] : 80 year old anxious   with hx of hyperlipidemia and mild hypertension presents for evaluation of left ear fullness, discomfort and otorrhea past 10 days \par \par \par She is mildly anxious and depressed but not suicidal. \par \par Has documented  moderately severe mitral regurgitation  without CHF\par \par She wears hearing aides \par \par \par

## 2020-05-20 NOTE — HISTORY OF PRESENT ILLNESS
[FreeTextEntry1] : c/o dizziness but denies syncope, fevers, cough, instrumentation of her ear\par \par \par There is muffled hearing in left ear \par \par She wears a hearing aide in the right ear \par \par

## 2020-05-20 NOTE — PHYSICAL EXAM
[Normal Appearance] : normal appearance [Well Groomed] : well groomed [General Appearance - Well Developed] : well developed [No Deformities] : no deformities [General Appearance - Well Nourished] : well nourished [Normal Conjunctiva] : the conjunctiva exhibited no abnormalities [General Appearance - In No Acute Distress] : no acute distress [Eyelids - No Xanthelasma] : the eyelids demonstrated no xanthelasmas [No Oral Cyanosis] : no oral cyanosis [Normal Jugular Venous A Waves Present] : normal jugular venous A waves present [Normal Jugular Venous V Waves Present] : normal jugular venous V waves present [No Jugular Venous Amaro A Waves] : no jugular venous amaro A waves [Respiration, Rhythm And Depth] : normal respiratory rhythm and effort [Exaggerated Use Of Accessory Muscles For Inspiration] : no accessory muscle use [Auscultation Breath Sounds / Voice Sounds] : lungs were clear to auscultation bilaterally [Heart Sounds] : normal S1 and S2 [Arterial Pulses Normal] : the arterial pulses were normal [Heart Rate And Rhythm] : heart rate and rhythm were normal [Systolic grade ___/6] : A grade [unfilled]/6 systolic murmur was heard. [Edema] : no peripheral edema present [FreeTextEntry1] : normal capillary refill [Abnormal Walk] : normal gait [Cyanosis, Localized] : no localized cyanosis [Nail Clubbing] : no clubbing of the fingernails [Gait - Sufficient For Exercise Testing] : the gait was sufficient for exercise testing [Petechial Hemorrhages (___cm)] : no petechial hemorrhages [Skin Color & Pigmentation] : normal skin color and pigmentation [] : no rash [Skin Turgor] : normal skin turgor [Mood] : the mood was normal [Oriented To Time, Place, And Person] : oriented to person, place, and time [Impaired Insight] : insight and judgment were intact

## 2020-05-20 NOTE — DISCUSSION/SUMMARY
[Essential Hypertension] : essential hypertension [Stable] : stable [Responding to Treatment] : responding to treatment [None] : none [With Me] : with me [FreeTextEntry1] : venipuncture performed\par \par Cipro 500mg po bid for 5 days,  cipro ear drops 3   bid for 7 days \par \par \par Reassurance provided \par \par carotid duplex scan shows mild plaques with  intimal medial thickening bilaterally  without significant obstruction

## 2020-05-20 NOTE — REVIEW OF SYSTEMS
[Recent Weight Loss (___ Lbs)] : no recent weight loss [Recent Weight Gain (___ Lbs)] : recent [unfilled] ~Ulb weight gain [Loss Of Hearing] : hearing loss [Shortness Of Breath] : no shortness of breath [Palpitations] : no palpitations [Dysuria] : no dysuria [Incontinence] : no incontinence [Pelvic Pain] : no pelvic pain [Dysmenorrhea] : no dysmenorrhea [Depression] : depression [Vaginal Discharge] : no vaginal discharge [Abn Vaginal Bleeding] : no unexplained vaginal bleeding [Suicidal] : not suicidal [Swollen Glands] : swollen glands [Anxiety] : anxiety [Negative] : Endocrine [FreeTextEntry1] : painful left ear

## 2020-05-20 NOTE — ASSESSMENT
[FreeTextEntry1] : stable hemodynamics\par \par \par dizziness with atherosclerosis \par \par \par No CHF on exam\par \par \par left sided otitis

## 2020-05-21 LAB
25(OH)D3 SERPL-MCNC: 51.9 NG/ML
ALBUMIN SERPL ELPH-MCNC: 4.3 G/DL
ALP BLD-CCNC: 74 U/L
ALT SERPL-CCNC: 16 U/L
ANION GAP SERPL CALC-SCNC: 14 MMOL/L
AST SERPL-CCNC: 22 U/L
BASOPHILS # BLD AUTO: 0.1 K/UL
BASOPHILS NFR BLD AUTO: 1.3 %
BILIRUB SERPL-MCNC: 0.8 MG/DL
BUN SERPL-MCNC: 33 MG/DL
CALCIUM SERPL-MCNC: 9.9 MG/DL
CHLORIDE SERPL-SCNC: 105 MMOL/L
CHOLEST SERPL-MCNC: 181 MG/DL
CHOLEST/HDLC SERPL: 2.2 RATIO
CO2 SERPL-SCNC: 25 MMOL/L
CREAT SERPL-MCNC: 1.03 MG/DL
EOSINOPHIL # BLD AUTO: 0.61 K/UL
EOSINOPHIL NFR BLD AUTO: 8.2 %
ESTIMATED AVERAGE GLUCOSE: 123 MG/DL
GLUCOSE SERPL-MCNC: 102 MG/DL
HBA1C MFR BLD HPLC: 5.9 %
HCT VFR BLD CALC: 39.8 %
HDLC SERPL-MCNC: 82 MG/DL
HGB BLD-MCNC: 12.4 G/DL
IMM GRANULOCYTES NFR BLD AUTO: 0.3 %
LDLC SERPL CALC-MCNC: 78 MG/DL
LYMPHOCYTES # BLD AUTO: 0.9 K/UL
LYMPHOCYTES NFR BLD AUTO: 12 %
MAN DIFF?: NORMAL
MCHC RBC-ENTMCNC: 29.3 PG
MCHC RBC-ENTMCNC: 31.2 GM/DL
MCV RBC AUTO: 94.1 FL
MONOCYTES # BLD AUTO: 0.77 K/UL
MONOCYTES NFR BLD AUTO: 10.3 %
NEUTROPHILS # BLD AUTO: 5.07 K/UL
NEUTROPHILS NFR BLD AUTO: 67.9 %
NT-PROBNP SERPL-MCNC: 98 PG/ML
PLATELET # BLD AUTO: 180 K/UL
POTASSIUM SERPL-SCNC: 4.7 MMOL/L
PROT SERPL-MCNC: 6.7 G/DL
RBC # BLD: 4.23 M/UL
RBC # FLD: 14.6 %
SARS-COV-2 IGG SERPL IA-ACNC: 0.2 INDEX
SARS-COV-2 IGG SERPL QL IA: NEGATIVE
SODIUM SERPL-SCNC: 144 MMOL/L
TRIGL SERPL-MCNC: 106 MG/DL
TSH SERPL-ACNC: 3.04 UIU/ML
WBC # FLD AUTO: 7.47 K/UL

## 2020-07-30 ENCOUNTER — APPOINTMENT (OUTPATIENT)
Dept: HEART AND VASCULAR | Facility: CLINIC | Age: 80
End: 2020-07-30
Payer: MEDICARE

## 2020-07-30 VITALS
BODY MASS INDEX: 26.05 KG/M2 | SYSTOLIC BLOOD PRESSURE: 130 MMHG | OXYGEN SATURATION: 95 % | TEMPERATURE: 97.5 F | RESPIRATION RATE: 12 BRPM | HEART RATE: 127 BPM | DIASTOLIC BLOOD PRESSURE: 66 MMHG | WEIGHT: 147 LBS | HEIGHT: 63 IN

## 2020-07-30 DIAGNOSIS — I10 ESSENTIAL (PRIMARY) HYPERTENSION: ICD-10-CM

## 2020-07-30 DIAGNOSIS — I65.23 OCCLUSION AND STENOSIS OF BILATERAL CAROTID ARTERIES: ICD-10-CM

## 2020-07-30 PROCEDURE — 99214 OFFICE O/P EST MOD 30 MIN: CPT

## 2020-08-03 NOTE — PHYSICAL EXAM
[General Appearance - Well Developed] : well developed [Well Groomed] : well groomed [Normal Appearance] : normal appearance [General Appearance - Well Nourished] : well nourished [No Deformities] : no deformities [General Appearance - In No Acute Distress] : no acute distress [Normal Conjunctiva] : the conjunctiva exhibited no abnormalities [Eyelids - No Xanthelasma] : the eyelids demonstrated no xanthelasmas [No Oral Cyanosis] : no oral cyanosis [Normal Jugular Venous A Waves Present] : normal jugular venous A waves present [No Jugular Venous Amaro A Waves] : no jugular venous amaro A waves [Normal Jugular Venous V Waves Present] : normal jugular venous V waves present [Exaggerated Use Of Accessory Muscles For Inspiration] : no accessory muscle use [Respiration, Rhythm And Depth] : normal respiratory rhythm and effort [Auscultation Breath Sounds / Voice Sounds] : lungs were clear to auscultation bilaterally [Heart Rate And Rhythm] : heart rate and rhythm were normal [Heart Sounds] : normal S1 and S2 [Arterial Pulses Normal] : the arterial pulses were normal [Edema] : no peripheral edema present [Systolic grade ___/6] : A grade [unfilled]/6 systolic murmur was heard. [FreeTextEntry1] : normal capillary refill [Gait - Sufficient For Exercise Testing] : the gait was sufficient for exercise testing [Nail Clubbing] : no clubbing of the fingernails [Abnormal Walk] : normal gait [Cyanosis, Localized] : no localized cyanosis [Petechial Hemorrhages (___cm)] : no petechial hemorrhages [Skin Color & Pigmentation] : normal skin color and pigmentation [Skin Turgor] : normal skin turgor [No Xanthoma] : no  xanthoma was observed [] : no rash [Mood] : the mood was normal [Oriented To Time, Place, And Person] : oriented to person, place, and time [Impaired Insight] : insight and judgment were intact

## 2020-08-03 NOTE — REASON FOR VISIT
[Follow-Up - Clinic] : a clinic follow-up of [Hyperlipidemia] : hyperlipidemia [Hypertension] : hypertension [FreeTextEntry1] : 80 year old anxious   with hx of hyperlipidemia and mild hypertension presents for follow up \par \par \par She is mildly anxious and depressed but not suicidal -  22 year old grandson recently  of drug overdose \par \par Has documented  moderately severe mitral regurgitation  without CHF\par \par She wears hearing aides \par \par \par  [Mitral Regurgitation] : mitral regurgitation

## 2020-08-03 NOTE — REVIEW OF SYSTEMS
[Recent Weight Gain (___ Lbs)] : no recent weight gain [Recent Weight Loss (___ Lbs)] : recent [unfilled] ~Ulb weight loss [Loss Of Hearing] : hearing loss [Shortness Of Breath] : no shortness of breath [Palpitations] : no palpitations [Dysuria] : no dysuria [Pelvic Pain] : no pelvic pain [Incontinence] : no incontinence [Depression] : depression [Abn Vaginal Bleeding] : no unexplained vaginal bleeding [Dysmenorrhea] : no dysmenorrhea [Vaginal Discharge] : no vaginal discharge [Swollen Glands] : swollen glands [Anxiety] : anxiety [Suicidal] : not suicidal [Negative] : Integumentary

## 2020-08-03 NOTE — ASSESSMENT
[FreeTextEntry1] : stable hemodynamics\par \par No CHF on exam\par \par anxiety/depression improved\par \par \par prediabetes \par \par controlled dyslipidemia \par

## 2020-08-03 NOTE — DISCUSSION/SUMMARY
[Moderate Mitral Regurgitation] : moderate mitral regurgitation [Compensated] : compensated [___ Month(s)] : [unfilled] month(s) [Stable] : stable [With Me] : with me [FreeTextEntry1] : Medications reconciled and renewed\par \par Plan to return early October for flu vaccine\par \par Counseled regarding Covid 19 precautions\par \par Continue diet  and regular walking exercise

## 2020-08-03 NOTE — HISTORY OF PRESENT ILLNESS
[FreeTextEntry1] : Prior ear fullness resolved , she saw the ENT specialist, no infection present \par \par Requires renewal of medications\par \par Denies fevers, chills, cough or known exposure to Covid 19 infected persons\par \par Recent lipid well controlled ,  A1c 5.9%, BNP normal \par \par No falls, chest pain, syncope

## 2020-09-07 ENCOUNTER — RX RENEWAL (OUTPATIENT)
Age: 80
End: 2020-09-07

## 2020-09-14 ENCOUNTER — APPOINTMENT (OUTPATIENT)
Dept: HEART AND VASCULAR | Facility: CLINIC | Age: 80
End: 2020-09-14
Payer: MEDICARE

## 2020-09-14 VITALS
DIASTOLIC BLOOD PRESSURE: 60 MMHG | OXYGEN SATURATION: 94 % | HEART RATE: 87 BPM | HEIGHT: 63 IN | WEIGHT: 146 LBS | SYSTOLIC BLOOD PRESSURE: 110 MMHG | BODY MASS INDEX: 25.87 KG/M2 | RESPIRATION RATE: 12 BRPM

## 2020-09-14 DIAGNOSIS — I20.9 ANGINA PECTORIS, UNSPECIFIED: ICD-10-CM

## 2020-09-14 PROCEDURE — 93306 TTE W/DOPPLER COMPLETE: CPT

## 2020-09-14 PROCEDURE — 99214 OFFICE O/P EST MOD 30 MIN: CPT | Mod: 57

## 2020-09-15 ENCOUNTER — RX RENEWAL (OUTPATIENT)
Age: 80
End: 2020-09-15

## 2020-09-15 NOTE — DISCUSSION/SUMMARY
[Coronary Artery Disease] : coronary artery disease [Coronary Artery Catheterization] : coronary artery catheterization [Moderate Mitral Regurgitation] : moderate mitral regurgitation [Severe Mitral Regurgitation] : severe mitral regurgitation [Decompensated] : decompensated [Outpatient Evaluation] : outpatient evaluation [BNP] : B-type natriuretic peptide [With Me] : with me [de-identified] : refer to CT surgery for possible MV repair  [FreeTextEntry1] : results of echocardiogram reviewed with patient \par \par ofloxacin eye drops ordered\par \par right eye purulent discharge cultured\par \par will return for  labs with BNP \par \par Refer to Dr. Deven Friend for MV repair evaluation- will also need cardiac catheterization, possible hybrid procedure

## 2020-09-15 NOTE — HISTORY OF PRESENT ILLNESS
[FreeTextEntry1] : Patient presents for echocardiogram to follow up mitral regurgitation\par \par She notices increasing dyspnea on exertional and  angina  past couple of months\par \par Denies fevers, chills , cough \par \par Needs flu vaccine but will wait until October\par \par Denies syncope, or sustained palpitations \par \par She has irritated right eye with purulent discharge

## 2020-09-15 NOTE — REASON FOR VISIT
[Follow-Up - Clinic] : a clinic follow-up of [Angina Pectoris] : angina pectoris [Dyspnea] : dyspnea [Mitral Regurgitation] : mitral regurgitation [FreeTextEntry1] : 80 year old anxious   with hx of hyperlipidemia and mild hypertension presents for follow up \par \par \par She is mildly anxious and depressed but not suicidal -  22 year old grandson recently  of drug overdose \par \par Has documented  moderately severe mitral regurgitation  without CHF\par \par She wears hearing aides \par \par \par

## 2020-09-15 NOTE — ASSESSMENT
[FreeTextEntry1] : right eye conjunctivitis, probably bacterial \par \par Chronic moderately severe MR with increasing dyspnea\par \par angina pectoris

## 2020-09-15 NOTE — REVIEW OF SYSTEMS
[Recent Weight Gain (___ Lbs)] : no recent weight gain [Recent Weight Loss (___ Lbs)] : recent [unfilled] ~Ulb weight loss [Loss Of Hearing] : hearing loss [Shortness Of Breath] : no shortness of breath [Dyspnea on exertion] : dyspnea during exertion [Chest  Pressure] : chest pressure [Palpitations] : no palpitations [Dysuria] : no dysuria [Incontinence] : no incontinence [Pelvic Pain] : no pelvic pain [Dysmenorrhea] : no dysmenorrhea [Vaginal Discharge] : no vaginal discharge [Abn Vaginal Bleeding] : no unexplained vaginal bleeding [Depression] : depression [Anxiety] : anxiety [Suicidal] : not suicidal [Swollen Glands] : swollen glands [Negative] : Endocrine [FreeTextEntry1] : insomnia

## 2020-09-15 NOTE — PHYSICAL EXAM
[General Appearance - Well Developed] : well developed [Normal Appearance] : normal appearance [Well Groomed] : well groomed [General Appearance - Well Nourished] : well nourished [No Deformities] : no deformities [General Appearance - In No Acute Distress] : no acute distress [Eyelids - No Xanthelasma] : the eyelids demonstrated no xanthelasmas [No Oral Cyanosis] : no oral cyanosis [Normal Jugular Venous A Waves Present] : normal jugular venous A waves present [Normal Jugular Venous V Waves Present] : normal jugular venous V waves present [No Jugular Venous Amaro A Waves] : no jugular venous amaro A waves [Respiration, Rhythm And Depth] : normal respiratory rhythm and effort [Exaggerated Use Of Accessory Muscles For Inspiration] : no accessory muscle use [Auscultation Breath Sounds / Voice Sounds] : lungs were clear to auscultation bilaterally [Heart Rate And Rhythm] : heart rate and rhythm were normal [Heart Sounds] : normal S1 and S2 [Arterial Pulses Normal] : the arterial pulses were normal [Edema] : no peripheral edema present [Systolic grade ___/6] : A grade [unfilled]/6 systolic murmur was heard. [FreeTextEntry1] : normal capillary refill [Abnormal Walk] : normal gait [Gait - Sufficient For Exercise Testing] : the gait was sufficient for exercise testing [Nail Clubbing] : no clubbing of the fingernails [Cyanosis, Localized] : no localized cyanosis [Petechial Hemorrhages (___cm)] : no petechial hemorrhages [Skin Color & Pigmentation] : normal skin color and pigmentation [Skin Turgor] : normal skin turgor [] : no rash [No Xanthoma] : no  xanthoma was observed [Oriented To Time, Place, And Person] : oriented to person, place, and time [Impaired Insight] : insight and judgment were intact [Mood] : the mood was normal

## 2020-09-20 LAB — BACTERIA SPEC CULT: ABNORMAL

## 2020-09-27 NOTE — ED PROVIDER NOTE - NS ED MD EM SELECTION
Intubation Procedure Note    Indication: impending respiratory failure    Proceduralistt:  Dr. Dixon and Dr. Camacho    Consent: The patient provided verbal consent for this procedure.    Medications Used:  Midazolam 6mg  intravenously, 100 mg succinycholine intravenously, etomidate 20 mg intravenously,     Procedure: The patient was placed in the appropriate position.  Cricoid pressure was not required.  Intubation was performed by direct laryngoscopy using a laryngoscope and a 7.5 cuffed endotracheal tube.  The cuff was then inflated and the tube was secured appropriately at a distance of 24 cm to the dental ridge.  Initial confirmation of placement included bilateral breath sounds, colormetric CO2 detector, ET tube fogging, symmetrical and adequate chest rise.  A chest x-ray to verify correct placement of the tube showed appropriate tube position.    The patient tolerated the procedure well, without difficulty.     Complications: None     [id number] Comprehensive

## 2020-10-19 ENCOUNTER — APPOINTMENT (OUTPATIENT)
Dept: HEART AND VASCULAR | Facility: CLINIC | Age: 80
End: 2020-10-19
Payer: MEDICARE

## 2020-10-19 VITALS
SYSTOLIC BLOOD PRESSURE: 130 MMHG | HEIGHT: 63 IN | TEMPERATURE: 93.7 F | OXYGEN SATURATION: 97 % | HEART RATE: 96 BPM | BODY MASS INDEX: 26.22 KG/M2 | WEIGHT: 148 LBS | DIASTOLIC BLOOD PRESSURE: 66 MMHG

## 2020-10-19 DIAGNOSIS — Z86.69 PERSONAL HISTORY OF OTHER DISEASES OF THE NERVOUS SYSTEM AND SENSE ORGANS: ICD-10-CM

## 2020-10-19 DIAGNOSIS — R00.2 PALPITATIONS: ICD-10-CM

## 2020-10-19 DIAGNOSIS — R07.9 CHEST PAIN, UNSPECIFIED: ICD-10-CM

## 2020-10-19 PROCEDURE — 36415 COLL VENOUS BLD VENIPUNCTURE: CPT

## 2020-10-19 PROCEDURE — 99214 OFFICE O/P EST MOD 30 MIN: CPT

## 2020-10-23 ENCOUNTER — APPOINTMENT (OUTPATIENT)
Dept: HEART AND VASCULAR | Facility: CLINIC | Age: 80
End: 2020-10-23
Payer: MEDICARE

## 2020-10-23 VITALS
RESPIRATION RATE: 16 BRPM | BODY MASS INDEX: 27.05 KG/M2 | SYSTOLIC BLOOD PRESSURE: 138 MMHG | HEIGHT: 62 IN | HEART RATE: 102 BPM | DIASTOLIC BLOOD PRESSURE: 74 MMHG | WEIGHT: 147 LBS | TEMPERATURE: 96.3 F | OXYGEN SATURATION: 96 %

## 2020-10-23 DIAGNOSIS — Z23 ENCOUNTER FOR IMMUNIZATION: ICD-10-CM

## 2020-10-23 PROCEDURE — 99213 OFFICE O/P EST LOW 20 MIN: CPT

## 2020-10-23 PROCEDURE — 90662 IIV NO PRSV INCREASED AG IM: CPT

## 2020-10-23 PROCEDURE — G0008: CPT

## 2020-10-23 PROCEDURE — 93227 XTRNL ECG REC<48 HR R&I: CPT

## 2020-10-23 NOTE — REVIEW OF SYSTEMS
[Recent Weight Gain (___ Lbs)] : no recent weight gain [Recent Weight Loss (___ Lbs)] : recent [unfilled] ~Ulb weight loss [Loss Of Hearing] : hearing loss [Shortness Of Breath] : no shortness of breath [Dyspnea on exertion] : dyspnea during exertion [Chest  Pressure] : no chest pressure [Palpitations] : palpitations [Dysuria] : no dysuria [Incontinence] : no incontinence [Pelvic Pain] : no pelvic pain [Dysmenorrhea] : no dysmenorrhea [Vaginal Discharge] : no vaginal discharge [Abn Vaginal Bleeding] : no unexplained vaginal bleeding [Depression] : depression [Anxiety] : anxiety [Suicidal] : not suicidal [Swollen Glands] : swollen glands [Negative] : Endocrine [FreeTextEntry1] : insomnia

## 2020-10-23 NOTE — DISCUSSION/SUMMARY
[Moderate Mitral Regurgitation] : moderate mitral regurgitation [Compensated] : compensated [Outpatient Evaluation] : outpatient evaluation [With Me] : with me [FreeTextEntry1] : HD fluzone administered\par \par Seroquel reordered\par \par Zio patch 14 days applied \par \par counseled patient to stop all alcohol

## 2020-10-23 NOTE — REASON FOR VISIT
[Follow-Up - Clinic] : a clinic follow-up of [Dyspnea] : dyspnea [Mitral Regurgitation] : mitral regurgitation [Palpitations] : palpitations [FreeTextEntry1] : 80 year old anxious   with hx of hyperlipidemia and mild hypertension presents for follow up \par \par \par She is mildly anxious and depressed but not suicidal -  22 year old grandson recently  of drug overdose \par \par Has documented  moderately severe mitral regurgitation  without CHF\par \par She wears hearing aides \par \par \par

## 2020-10-23 NOTE — HISTORY OF PRESENT ILLNESS
[FreeTextEntry1] : Presents today for flu vaccine \par \par Requires 14 day Zio Patch for arrhythmia detection\par \par No syncope\par \par Right eye conjunctivitis improving with eye drops \par \par Admits to drinking a little wine from time to time \par

## 2020-11-08 ENCOUNTER — RX RENEWAL (OUTPATIENT)
Age: 80
End: 2020-11-08

## 2020-11-09 ENCOUNTER — RX RENEWAL (OUTPATIENT)
Age: 80
End: 2020-11-09

## 2020-11-13 ENCOUNTER — APPOINTMENT (OUTPATIENT)
Dept: HEART AND VASCULAR | Facility: CLINIC | Age: 80
End: 2020-11-13
Payer: MEDICARE

## 2020-11-13 VITALS
WEIGHT: 145 LBS | RESPIRATION RATE: 16 BRPM | HEART RATE: 97 BPM | BODY MASS INDEX: 26.68 KG/M2 | TEMPERATURE: 98 F | SYSTOLIC BLOOD PRESSURE: 140 MMHG | OXYGEN SATURATION: 97 % | DIASTOLIC BLOOD PRESSURE: 60 MMHG | HEIGHT: 62 IN

## 2020-11-13 DIAGNOSIS — I25.10 ATHEROSCLEROTIC HEART DISEASE OF NATIVE CORONARY ARTERY W/OUT ANGINA PECTORIS: ICD-10-CM

## 2020-11-13 DIAGNOSIS — R94.31 ABNORMAL ELECTROCARDIOGRAM [ECG] [EKG]: ICD-10-CM

## 2020-11-13 PROCEDURE — A9500: CPT

## 2020-11-13 PROCEDURE — 78451 HT MUSCLE IMAGE SPECT SING: CPT

## 2020-11-13 PROCEDURE — 93015 CV STRESS TEST SUPVJ I&R: CPT

## 2020-11-14 PROBLEM — I25.10 ATHEROSCLEROTIC HEART DISEASE OF NATIVE CORONARY ARTERY WITHOUT ANGINA PECTORIS: Status: ACTIVE | Noted: 2018-12-16

## 2020-11-29 PROBLEM — R07.9 CHEST PAIN: Status: ACTIVE | Noted: 2020-09-15

## 2020-11-29 NOTE — HISTORY OF PRESENT ILLNESS
[FreeTextEntry1] : Main complaints are atypical chest  pain and worsening THORPE\par \par EKG shows NSR 85 bpm  with minimal ST segment depressions  without ectopy, :LVH or pathologic Q waves \par \par Needs refills of  Seroquel\par \par She is also complaining of palpitations but denies syncope

## 2020-11-29 NOTE — ASSESSMENT
[FreeTextEntry1] : atypical chest pain, possible progression of CAD\par \par moderate MR with worsening dyspnea \par \par palpitations \par \par

## 2020-11-29 NOTE — REASON FOR VISIT
[Follow-Up - Clinic] : a clinic follow-up of [Chest Pain] : chest pain [Coronary Artery Disease] : coronary artery disease [Dyspnea] : dyspnea [Mitral Regurgitation] : mitral regurgitation [FreeTextEntry1] : 80 year old anxious   with hx of  CAD and moderate MR  presents for follow up \par \par \par She is mildly anxious and depressed but not suicidal -  22 year old grandson recently  of drug overdose \par \par Has documented  moderately severe mitral regurgitation  without CHF\par \par She wears hearing aides \par \par \par

## 2020-11-29 NOTE — REVIEW OF SYSTEMS
[Recent Weight Gain (___ Lbs)] : no recent weight gain [Recent Weight Loss (___ Lbs)] : recent [unfilled] ~Ulb weight loss [Loss Of Hearing] : hearing loss [Shortness Of Breath] : no shortness of breath [Dyspnea on exertion] : dyspnea during exertion [Chest  Pressure] : no chest pressure [Chest Pain] : chest pain [Palpitations] : palpitations [Dysuria] : no dysuria [Incontinence] : no incontinence [Pelvic Pain] : no pelvic pain [Dysmenorrhea] : no dysmenorrhea [Vaginal Discharge] : no vaginal discharge [Abn Vaginal Bleeding] : no unexplained vaginal bleeding [Depression] : depression [Anxiety] : anxiety [Suicidal] : not suicidal [Swollen Glands] : swollen glands [Negative] : Endocrine [FreeTextEntry1] : insomnia

## 2020-11-29 NOTE — DISCUSSION/SUMMARY
[Possible Cardiac Ischemia (Intermd Prob)] : possible cardiac ischemia (intermediate probability) [Outpatient Evaluation] : outpatient evaluation [SPECT Imaging] : SPECT imaging [Moderate Mitral Regurgitation] : moderate mitral regurgitation [Compensated] : compensated [___ Week(s)] : [unfilled] week(s) [With Me] : with me [FreeTextEntry1] : set up  nuclear stress test \par \par set up 14 day Zio patch \par \par renew Seroquel Rx

## 2020-11-29 NOTE — PHYSICAL EXAM
[General Appearance - Well Developed] : well developed [Normal Appearance] : normal appearance [Well Groomed] : well groomed [General Appearance - Well Nourished] : well nourished [No Deformities] : no deformities [General Appearance - In No Acute Distress] : no acute distress [Normal Conjunctiva] : the conjunctiva exhibited no abnormalities [Eyelids - No Xanthelasma] : the eyelids demonstrated no xanthelasmas [Normal Jugular Venous A Waves Present] : normal jugular venous A waves present [Normal Jugular Venous V Waves Present] : normal jugular venous V waves present [No Jugular Venous Amaro A Waves] : no jugular venous amaro A waves [Respiration, Rhythm And Depth] : normal respiratory rhythm and effort [Exaggerated Use Of Accessory Muscles For Inspiration] : no accessory muscle use [Auscultation Breath Sounds / Voice Sounds] : lungs were clear to auscultation bilaterally [Heart Rate And Rhythm] : heart rate and rhythm were normal [Heart Sounds] : normal S1 and S2 [Arterial Pulses Normal] : the arterial pulses were normal [Edema] : no peripheral edema present [Systolic grade ___/6] : A grade [unfilled]/6 systolic murmur was heard. [FreeTextEntry1] : normal capillary refill [Abnormal Walk] : normal gait [Gait - Sufficient For Exercise Testing] : the gait was sufficient for exercise testing [Nail Clubbing] : no clubbing of the fingernails [Cyanosis, Localized] : no localized cyanosis [Petechial Hemorrhages (___cm)] : no petechial hemorrhages [Skin Color & Pigmentation] : normal skin color and pigmentation [Skin Turgor] : normal skin turgor [] : no rash [No Xanthoma] : no  xanthoma was observed [Oriented To Time, Place, And Person] : oriented to person, place, and time [Impaired Insight] : insight and judgment were intact [Mood] : the mood was normal

## 2020-12-09 ENCOUNTER — RX RENEWAL (OUTPATIENT)
Age: 80
End: 2020-12-09

## 2020-12-18 ENCOUNTER — RX RENEWAL (OUTPATIENT)
Age: 80
End: 2020-12-18

## 2020-12-22 ENCOUNTER — RX RENEWAL (OUTPATIENT)
Age: 80
End: 2020-12-22

## 2020-12-23 PROBLEM — Z86.69 HISTORY OF BACTERIAL CONJUNCTIVITIS: Status: RESOLVED | Noted: 2020-09-14 | Resolved: 2020-12-23

## 2020-12-24 NOTE — BEHAVIORAL HEALTH ASSESSMENT NOTE - RISK ASSESSMENT
Referred by: Nj Huizar MD; Medical Diagnosis (from order):    Diagnosis Information      Diagnosis    R42 (ICD-10-CM) - Dizziness and giddiness                Physical Therapy -  Daily Treatment Note    Visit:  Visit count could not be calculated. Make sure you are using a visit which is associated with an episode.     SUBJECTIVE                                                                                                             Pt performing ex, mac single leg standing  X 30 sec tid.  He states that he was good yesterday but woke up dizzy this am.  He started to note when he gets the dizziness and says that am is worse and is better at night time. He also indicated that he slept for 1.5-2 hrs in the past 2 nights.       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  TREATMENT                                                                                                                  Neuromuscular Re-Education:  VOR x 1 in standing(pitch and yaw) x 1 min each, ft together  SLS with support on ski pole x 10 x 10 SH  Standing Df against wall  Step up: front x 6 in step x 30 each side  VOR x 1 with walkign(very disontinous with stiff arm swing, no trunk rotation) with freq vc to rotate head during ex  SLS lat vector x 30 each side, no LOB  1/4 turns x 4 CW, CCW->very discontinous    Skilled input: as detailed above    Writer verbally educated and received verbal consent for hand placement, positioning of patient, and techniques to be performed today from patient for therapist position for techniques as  described above and how they are pertinent to the patient's plan of care.    Home Exercise Program: VOR x 1(horizontal/yaw)->corrected from vertical/pitch position  SLS with foam roll/less stable than chair/table x 10 reps x 10 each side  Standing DF against wall       ASSESSMENT                                                                                                             VOR x 1 ex: was performing head pitching instead of head yawing.  Revised SLS ex to a less stable support(stick or foam roll instead of a chair to challenge his balance further).  We will review his HEP again on his next visit.  Inabilty to sleep may also be a factor in his dizziness issue.  He said that he used to have a CPAP machine but feels that the mask is too tight.        PLAN                                                                                                                           Suggestions for next session as indicated: Progress per plan of care          Procedures and total treatment time documented Time Entry flowsheet.   Low Acute Suicide Risk Per pt (poor historian), she has never had suicidal ideation/intent/plan/attempt, and denies now. However, need collateral info to confirm this.

## 2021-01-02 ENCOUNTER — RX RENEWAL (OUTPATIENT)
Age: 81
End: 2021-01-02

## 2021-01-08 ENCOUNTER — APPOINTMENT (OUTPATIENT)
Dept: HEART AND VASCULAR | Facility: CLINIC | Age: 81
End: 2021-01-08
Payer: MEDICARE

## 2021-01-08 VITALS
BODY MASS INDEX: 27.6 KG/M2 | WEIGHT: 150 LBS | HEART RATE: 115 BPM | OXYGEN SATURATION: 94 % | SYSTOLIC BLOOD PRESSURE: 130 MMHG | RESPIRATION RATE: 16 BRPM | DIASTOLIC BLOOD PRESSURE: 90 MMHG | TEMPERATURE: 97 F | HEIGHT: 62 IN

## 2021-01-08 DIAGNOSIS — F41.9 ANXIETY DISORDER, UNSPECIFIED: ICD-10-CM

## 2021-01-08 PROCEDURE — 99213 OFFICE O/P EST LOW 20 MIN: CPT

## 2021-01-08 RX ORDER — OFLOXACIN OTIC 3 MG/ML
0.3 SOLUTION AURICULAR (OTIC) EVERY 8 HOURS
Qty: 1 | Refills: 1 | Status: DISCONTINUED | COMMUNITY
Start: 2020-05-20 | End: 2021-01-08

## 2021-01-10 NOTE — HISTORY OF PRESENT ILLNESS
[FreeTextEntry1] : c/o right eye purulent discharge and swelling with blurred vision over past several days, not getting better with OTC eye drops \par \par Has hx of bacterial conjunctivitis  and wears mascara \par \par No recent eye surgery \par \par No fevers, cough, chills or eye pain or photophobia

## 2021-01-10 NOTE — REASON FOR VISIT
[Follow-Up - Clinic] : a clinic follow-up of [Coronary Artery Disease] : coronary artery disease [Hyperlipidemia] : hyperlipidemia [Hypertension] : hypertension [Mitral Regurgitation] : mitral regurgitation [FreeTextEntry1] : 80 year old anxious   with hx of  CAD and moderate MR  presents for follow up \par \par \par She is mildly anxious and depressed but not suicidal -  22 year old grandson recently  of drug overdose \par \par Has documented  moderately severe mitral regurgitation  without CHF\par \par She wears hearing aides \par \par \par

## 2021-01-10 NOTE — PHYSICAL EXAM
[General Appearance - Well Developed] : well developed [Normal Appearance] : normal appearance [Well Groomed] : well groomed [General Appearance - Well Nourished] : well nourished [No Deformities] : no deformities [General Appearance - In No Acute Distress] : no acute distress [Eyelids - No Xanthelasma] : the eyelids demonstrated no xanthelasmas [Normal Jugular Venous A Waves Present] : normal jugular venous A waves present [Normal Jugular Venous V Waves Present] : normal jugular venous V waves present [No Jugular Venous Amaro A Waves] : no jugular venous amaro A waves [Respiration, Rhythm And Depth] : normal respiratory rhythm and effort [Exaggerated Use Of Accessory Muscles For Inspiration] : no accessory muscle use [Auscultation Breath Sounds / Voice Sounds] : lungs were clear to auscultation bilaterally [Heart Rate And Rhythm] : heart rate and rhythm were normal [Heart Sounds] : normal S1 and S2 [Arterial Pulses Normal] : the arterial pulses were normal [Edema] : no peripheral edema present [Systolic grade ___/6] : A grade [unfilled]/6 systolic murmur was heard. [FreeTextEntry1] : normal capillary refill [Abnormal Walk] : normal gait [Gait - Sufficient For Exercise Testing] : the gait was sufficient for exercise testing [Nail Clubbing] : no clubbing of the fingernails [Cyanosis, Localized] : no localized cyanosis [Petechial Hemorrhages (___cm)] : no petechial hemorrhages [Skin Color & Pigmentation] : normal skin color and pigmentation [Skin Turgor] : normal skin turgor [] : no rash [No Xanthoma] : no  xanthoma was observed [Oriented To Time, Place, And Person] : oriented to person, place, and time [Impaired Insight] : insight and judgment were intact [Mood] : the mood was normal

## 2021-01-10 NOTE — DISCUSSION/SUMMARY
[Mild Mitral Regurgitation] : mild mitral regurgitation [Moderate Mitral Regurgitation] : moderate mitral regurgitation [Compensated] : compensated [None] : none [___ Week(s)] : [unfilled] week(s) [With Me] : with me [FreeTextEntry1] : warm compressed to right eye  tid \par \par start ofloxacin eye drops tid \par \par stop using mascara  until completely healed \par \par If not much improved by Monday,  will refer to ophthalmologist

## 2021-01-10 NOTE — REVIEW OF SYSTEMS
[Recent Weight Gain (___ Lbs)] : recent [unfilled] ~Ulb weight gain [Recent Weight Loss (___ Lbs)] : no recent weight loss [Blurry Vision] : blurred vision [Eye Pain] : no eye pain [Loss Of Hearing] : hearing loss [Shortness Of Breath] : no shortness of breath [Dyspnea on exertion] : dyspnea during exertion [Chest  Pressure] : no chest pressure [Chest Pain] : no chest pain [Palpitations] : no palpitations [Dysuria] : no dysuria [Incontinence] : no incontinence [Pelvic Pain] : no pelvic pain [Dysmenorrhea] : no dysmenorrhea [Vaginal Discharge] : no vaginal discharge [Abn Vaginal Bleeding] : no unexplained vaginal bleeding [Depression] : depression [Anxiety] : anxiety [Suicidal] : not suicidal [Swollen Glands] : swollen glands [Negative] : Endocrine [FreeTextEntry1] : insomnia

## 2021-01-10 NOTE — ASSESSMENT
[FreeTextEntry1] : stable hemodynamics\par \par bacterial conjunctivitis \par \par anxiety disorder

## 2021-01-14 LAB — BACTERIA SPEC CULT: ABNORMAL

## 2021-01-25 ENCOUNTER — RX RENEWAL (OUTPATIENT)
Age: 81
End: 2021-01-25

## 2021-02-11 ENCOUNTER — RX RENEWAL (OUTPATIENT)
Age: 81
End: 2021-02-11

## 2021-02-19 ENCOUNTER — RX RENEWAL (OUTPATIENT)
Age: 81
End: 2021-02-19

## 2021-02-26 ENCOUNTER — RX RENEWAL (OUTPATIENT)
Age: 81
End: 2021-02-26

## 2021-03-23 ENCOUNTER — LABORATORY RESULT (OUTPATIENT)
Age: 81
End: 2021-03-23

## 2021-03-23 ENCOUNTER — APPOINTMENT (OUTPATIENT)
Dept: HEART AND VASCULAR | Facility: CLINIC | Age: 81
End: 2021-03-23
Payer: MEDICARE

## 2021-03-23 VITALS
RESPIRATION RATE: 17 BRPM | OXYGEN SATURATION: 96 % | HEIGHT: 62 IN | HEART RATE: 106 BPM | DIASTOLIC BLOOD PRESSURE: 70 MMHG | SYSTOLIC BLOOD PRESSURE: 112 MMHG | TEMPERATURE: 97.3 F | WEIGHT: 144 LBS | BODY MASS INDEX: 26.5 KG/M2

## 2021-03-23 DIAGNOSIS — I11.9 HYPERTENSIVE HEART DISEASE W/OUT HEART FAILURE: ICD-10-CM

## 2021-03-23 DIAGNOSIS — I25.10 ATHEROSCLEROTIC HEART DISEASE OF NATIVE CORONARY ARTERY W/OUT ANGINA PECTORIS: ICD-10-CM

## 2021-03-23 DIAGNOSIS — E78.5 HYPERLIPIDEMIA, UNSPECIFIED: ICD-10-CM

## 2021-03-23 DIAGNOSIS — H93.8X9 OTHER SPECIFIED DISORDERS OF EAR, UNSPECIFIED EAR: ICD-10-CM

## 2021-03-23 PROCEDURE — 69210 REMOVE IMPACTED EAR WAX UNI: CPT | Mod: RT

## 2021-03-23 PROCEDURE — 36415 COLL VENOUS BLD VENIPUNCTURE: CPT

## 2021-03-23 PROCEDURE — 99214 OFFICE O/P EST MOD 30 MIN: CPT | Mod: 25

## 2021-03-23 PROCEDURE — 93000 ELECTROCARDIOGRAM COMPLETE: CPT | Mod: 79

## 2021-03-25 PROBLEM — I11.9 BENIGN HYPERTENSIVE HEART DISEASE WITHOUT CHF: Status: ACTIVE | Noted: 2017-12-15

## 2021-03-25 PROBLEM — I25.10 CORONARY ARTERY DISEASE: Status: ACTIVE | Noted: 2021-03-25

## 2021-03-25 LAB
25(OH)D3 SERPL-MCNC: 47.9 NG/ML
ALBUMIN SERPL ELPH-MCNC: 4.2 G/DL
ALP BLD-CCNC: 86 U/L
ALT SERPL-CCNC: 16 U/L
ANION GAP SERPL CALC-SCNC: 14 MMOL/L
APPEARANCE: ABNORMAL
AST SERPL-CCNC: 22 U/L
BASOPHILS # BLD AUTO: 0.09 K/UL
BASOPHILS NFR BLD AUTO: 1.2 %
BILIRUB SERPL-MCNC: 0.6 MG/DL
BILIRUBIN URINE: NEGATIVE
BLOOD URINE: NEGATIVE
BUN SERPL-MCNC: 47 MG/DL
CALCIUM SERPL-MCNC: 9.7 MG/DL
CHLORIDE SERPL-SCNC: 103 MMOL/L
CHOLEST SERPL-MCNC: 169 MG/DL
CO2 SERPL-SCNC: 23 MMOL/L
COLOR: YELLOW
CREAT SERPL-MCNC: 1.12 MG/DL
CREAT SPEC-SCNC: 105 MG/DL
EOSINOPHIL # BLD AUTO: 0.39 K/UL
EOSINOPHIL NFR BLD AUTO: 5.1 %
ESTIMATED AVERAGE GLUCOSE: 126 MG/DL
FOLATE SERPL-MCNC: 2.8 NG/ML
GLUCOSE QUALITATIVE U: NEGATIVE
GLUCOSE SERPL-MCNC: 104 MG/DL
HBA1C MFR BLD HPLC: 6 %
HCT VFR BLD CALC: 37.3 %
HDLC SERPL-MCNC: 83 MG/DL
HGB BLD-MCNC: 11.4 G/DL
IMM GRANULOCYTES NFR BLD AUTO: 0.6 %
KETONES URINE: NEGATIVE
LDLC SERPL CALC-MCNC: 66 MG/DL
LEUKOCYTE ESTERASE URINE: ABNORMAL
LYMPHOCYTES # BLD AUTO: 1.23 K/UL
LYMPHOCYTES NFR BLD AUTO: 16 %
MAN DIFF?: NORMAL
MCHC RBC-ENTMCNC: 28.6 PG
MCHC RBC-ENTMCNC: 30.6 GM/DL
MCV RBC AUTO: 93.7 FL
MICROALBUMIN 24H UR DL<=1MG/L-MCNC: 1.2 MG/DL
MICROALBUMIN/CREAT 24H UR-RTO: 12 MG/G
MONOCYTES # BLD AUTO: 0.66 K/UL
MONOCYTES NFR BLD AUTO: 8.6 %
NEUTROPHILS # BLD AUTO: 5.28 K/UL
NEUTROPHILS NFR BLD AUTO: 68.5 %
NITRITE URINE: NEGATIVE
NONHDLC SERPL-MCNC: 85 MG/DL
NT-PROBNP SERPL-MCNC: 176 PG/ML
PH URINE: 5
PLATELET # BLD AUTO: 236 K/UL
POTASSIUM SERPL-SCNC: 4.6 MMOL/L
PROT SERPL-MCNC: 6.9 G/DL
PROTEIN URINE: NORMAL
RBC # BLD: 3.98 M/UL
RBC # FLD: 15.3 %
SODIUM SERPL-SCNC: 140 MMOL/L
SPECIFIC GRAVITY URINE: 1.03
TRIGL SERPL-MCNC: 95 MG/DL
TSH SERPL-ACNC: 3.09 UIU/ML
UROBILINOGEN URINE: NORMAL
VIT B12 SERPL-MCNC: 571 PG/ML
WBC # FLD AUTO: 7.7 K/UL

## 2021-03-25 NOTE — DISCUSSION/SUMMARY
[Moderate Mitral Regurgitation] : moderate mitral regurgitation [Compensated] : compensated [None] : none [___ Month(s)] : [unfilled] month(s) [With Me] : with me [FreeTextEntry1] : venipuncture performed  with BNP, A1c, lipids, etc \par \par right ear wax disimpacted and hearing restored \par \par medications reconciled

## 2021-03-25 NOTE — HISTORY OF PRESENT ILLNESS
[FreeTextEntry1] : \par  EKG shows NSR 90 bpm  without ectopy, ischemia or LVH   normal axis and intervals \par \par main complaint is  left ear discomfort  without drainage , tinnitus . + sense of fullness \par \par Had Pfizer Covid vaccination \par \par No recent falls, syncope, chest pain\par \par Some dyspnea on exertion \par \par Requires blood work today \par \par \par Recently unable to get home care/     she does hansa  and a recent water leak from her apartment caused significant water damage

## 2021-03-25 NOTE — REVIEW OF SYSTEMS
[Recent Weight Gain (___ Lbs)] : no recent weight gain [Recent Weight Loss (___ Lbs)] : recent [unfilled] ~Ulb weight loss [Blurry Vision] : blurred vision [Eye Pain] : no eye pain [Earache] : earache [Loss Of Hearing] : hearing loss [Shortness Of Breath] : no shortness of breath [Dyspnea on exertion] : dyspnea during exertion [Chest  Pressure] : no chest pressure [Chest Pain] : no chest pain [Palpitations] : no palpitations [Dysuria] : no dysuria [Incontinence] : no incontinence [Pelvic Pain] : no pelvic pain [Dysmenorrhea] : no dysmenorrhea [Vaginal Discharge] : no vaginal discharge [Abn Vaginal Bleeding] : no unexplained vaginal bleeding [Memory Lapses Or Loss] : memory lapses or loss [Depression] : depression [Anxiety] : anxiety [Suicidal] : not suicidal [Swollen Glands] : swollen glands [Negative] : Endocrine [FreeTextEntry1] : insomnia

## 2021-03-25 NOTE — REASON FOR VISIT
[Follow-Up - Clinic] : a clinic follow-up of [Coronary Artery Disease] : coronary artery disease [Hyperlipidemia] : hyperlipidemia [Hypertension] : hypertension [Mitral Regurgitation] : mitral regurgitation [FreeTextEntry1] :  80 year old female with  HTN, moderate MR  and mild cognitive decline  has  hx of alcohol, abuse in the past. \par \par \par She has mild prediabetes \par \par Was unable to keep her recent appointment for nuclear stress test

## 2021-04-23 DIAGNOSIS — F22 DELUSIONAL DISORDERS: ICD-10-CM

## 2021-04-27 ENCOUNTER — RX RENEWAL (OUTPATIENT)
Age: 81
End: 2021-04-27

## 2021-05-10 ENCOUNTER — RX RENEWAL (OUTPATIENT)
Age: 81
End: 2021-05-10

## 2021-05-18 NOTE — ED ADULT TRIAGE NOTE - NSTRIAGECARE_GEN_A_ER
fingerstick/EKG Hydroxyzine Pregnancy And Lactation Text: This medication is not safe during pregnancy and should not be taken. It is also excreted in breast milk and breast feeding isn't recommended.

## 2021-05-24 ENCOUNTER — APPOINTMENT (OUTPATIENT)
Dept: HEART AND VASCULAR | Facility: CLINIC | Age: 81
End: 2021-05-24
Payer: MEDICARE

## 2021-05-24 VITALS
OXYGEN SATURATION: 95 % | SYSTOLIC BLOOD PRESSURE: 118 MMHG | HEIGHT: 62 IN | TEMPERATURE: 97.1 F | DIASTOLIC BLOOD PRESSURE: 70 MMHG | RESPIRATION RATE: 16 BRPM | BODY MASS INDEX: 26.5 KG/M2 | WEIGHT: 144 LBS | HEART RATE: 100 BPM

## 2021-05-24 DIAGNOSIS — H10.9 UNSPECIFIED CONJUNCTIVITIS: ICD-10-CM

## 2021-05-24 PROCEDURE — 36415 COLL VENOUS BLD VENIPUNCTURE: CPT

## 2021-05-24 PROCEDURE — 99213 OFFICE O/P EST LOW 20 MIN: CPT

## 2021-05-24 RX ORDER — OFLOXACIN 3 MG/ML
0.3 SOLUTION/ DROPS OPHTHALMIC EVERY 8 HOURS
Qty: 1 | Refills: 0 | Status: ACTIVE | COMMUNITY
Start: 2020-09-14 | End: 1900-01-01

## 2021-05-30 LAB
ALBUMIN SERPL ELPH-MCNC: 4.4 G/DL
ALP BLD-CCNC: 76 U/L
ALT SERPL-CCNC: 11 U/L
ANION GAP SERPL CALC-SCNC: 14 MMOL/L
AST SERPL-CCNC: 19 U/L
BACTERIA SPEC CULT: ABNORMAL
BASOPHILS # BLD AUTO: 0.12 K/UL
BASOPHILS NFR BLD AUTO: 1.7 %
BILIRUB SERPL-MCNC: 1.1 MG/DL
BUN SERPL-MCNC: 36 MG/DL
CALCIUM SERPL-MCNC: 10.1 MG/DL
CHLORIDE SERPL-SCNC: 103 MMOL/L
CO2 SERPL-SCNC: 23 MMOL/L
CREAT SERPL-MCNC: 1.14 MG/DL
EOSINOPHIL # BLD AUTO: 0.63 K/UL
EOSINOPHIL NFR BLD AUTO: 8.8 %
GLUCOSE SERPL-MCNC: 111 MG/DL
HCT VFR BLD CALC: 36.3 %
HGB BLD-MCNC: 11.4 G/DL
IMM GRANULOCYTES NFR BLD AUTO: 0.4 %
LYMPHOCYTES # BLD AUTO: 1.39 K/UL
LYMPHOCYTES NFR BLD AUTO: 19.3 %
MAN DIFF?: NORMAL
MCHC RBC-ENTMCNC: 28.6 PG
MCHC RBC-ENTMCNC: 31.4 GM/DL
MCV RBC AUTO: 91 FL
MONOCYTES # BLD AUTO: 0.94 K/UL
MONOCYTES NFR BLD AUTO: 13.1 %
NEUTROPHILS # BLD AUTO: 4.08 K/UL
NEUTROPHILS NFR BLD AUTO: 56.7 %
PLATELET # BLD AUTO: 242 K/UL
POTASSIUM SERPL-SCNC: 4.3 MMOL/L
PROT SERPL-MCNC: 7 G/DL
RBC # BLD: 3.99 M/UL
RBC # FLD: 14.5 %
SODIUM SERPL-SCNC: 139 MMOL/L
WBC # FLD AUTO: 7.19 K/UL

## 2021-06-03 ENCOUNTER — RX RENEWAL (OUTPATIENT)
Age: 81
End: 2021-06-03

## 2021-06-12 NOTE — ASSESSMENT
[FreeTextEntry1] : significant, recurrent right eye conjunctivitis \par \par mild dementia /anxiety disorder \par \par \par

## 2021-06-12 NOTE — HISTORY OF PRESENT ILLNESS
[FreeTextEntry1] : presents with swollen right medial aspect right eye lid with kolton pus \par \par No fevers\par \par \par This is a recurrent problem  and she has not seen ophthalmologist for this yet \par \par \par Using OTC drops without much improvement \par \par Denies visual loss

## 2021-06-12 NOTE — REASON FOR VISIT
[Hypertension] : hypertension [FreeTextEntry1] : 81 year old female with  HTN, moderate MR  and mild cognitive decline  has  hx of alcohol, abuse in the past. \par \par \par She has mild prediabetes \par \par Was unable to keep her recent appointment for nuclear stress test

## 2021-06-12 NOTE — DISCUSSION/SUMMARY
[FreeTextEntry1] : warm compress applied to right eye after culture were performed\par \par \par instructed to apply warm compresses 30 minutes 3 times daily \par \par ceftriaxone 250mg administered   right buttock without incident \par \par Rx for oflaxacin ophthalmic provided \par \par refer to Dr. Ricardo Shore for further evaluation and management

## 2021-06-12 NOTE — PHYSICAL EXAM
[Well Developed] : well developed [Well Nourished] : well nourished [No Acute Distress] : no acute distress [No Xanthelasma] : no xanthelasma [Normal Venous Pressure] : normal venous pressure [No Carotid Bruit] : no carotid bruit [Normal S1, S2] : normal S1, S2 [No Murmur] : no murmur [No Rub] : no rub [No Gallop] : no gallop [Clear Lung Fields] : clear lung fields [Good Air Entry] : good air entry [No Respiratory Distress] : no respiratory distress  [Normal Gait] : normal gait [Gait - Sufficient for Exercise Testing] : gait - sufficient for exercise testing [No Edema] : no edema [No Cyanosis] : no cyanosis [No Clubbing] : no clubbing [No Varicosities] : no varicosities [No Skin Lesions] : no skin lesions [No Rash] : no rash [Moves all extremities] : moves all extremities [No Focal Deficits] : no focal deficits [Normal Speech] : normal speech [Alert and Oriented] : alert and oriented [Normal memory] : normal memory [Appears Anxious] : appears anxious [de-identified] : right eye medial aspect lower lid swollen with pus

## 2021-06-23 ENCOUNTER — RX RENEWAL (OUTPATIENT)
Age: 81
End: 2021-06-23

## 2021-07-27 ENCOUNTER — RX RENEWAL (OUTPATIENT)
Age: 81
End: 2021-07-27

## 2021-08-27 ENCOUNTER — RX RENEWAL (OUTPATIENT)
Age: 81
End: 2021-08-27

## 2021-08-31 ENCOUNTER — APPOINTMENT (OUTPATIENT)
Dept: HEART AND VASCULAR | Facility: CLINIC | Age: 81
End: 2021-08-31
Payer: MEDICARE

## 2021-08-31 VITALS
WEIGHT: 143.6 LBS | BODY MASS INDEX: 26.43 KG/M2 | TEMPERATURE: 97.1 F | HEIGHT: 62 IN | OXYGEN SATURATION: 94 % | SYSTOLIC BLOOD PRESSURE: 119 MMHG | HEART RATE: 80 BPM | DIASTOLIC BLOOD PRESSURE: 69 MMHG

## 2021-08-31 DIAGNOSIS — E53.8 DEFICIENCY OF OTHER SPECIFIED B GROUP VITAMINS: ICD-10-CM

## 2021-08-31 DIAGNOSIS — R41.89 OTHER SYMPTOMS AND SIGNS INVOLVING COGNITIVE FUNCTIONS AND AWARENESS: ICD-10-CM

## 2021-08-31 DIAGNOSIS — I34.0 NONRHEUMATIC MITRAL (VALVE) INSUFFICIENCY: ICD-10-CM

## 2021-08-31 PROCEDURE — 93306 TTE W/DOPPLER COMPLETE: CPT

## 2021-08-31 PROCEDURE — 99214 OFFICE O/P EST MOD 30 MIN: CPT

## 2021-09-01 PROBLEM — R41.89 COGNITIVE DECLINE: Status: ACTIVE | Noted: 2021-07-09

## 2021-09-01 NOTE — REVIEW OF SYSTEMS
[Confusion] : confusion was observed [Dyspnea on exertion] : dyspnea during exertion [Memory Lapses Or Loss] : memory lapses or loss [Depression] : no depression [Anxiety] : anxiety [Under Stress] : not under stress [Suicidal] : not suicidal [Negative] : Heme/Lymph

## 2021-09-01 NOTE — REASON FOR VISIT
[Symptom and Test Evaluation] : symptom and test evaluation [Structural Heart and Valve Disease] : structural heart and valve disease [Coronary Artery Disease] : coronary artery disease [Family Member] : family member [FreeTextEntry1] : 81 year old female with  HTN, moderate MR  and  significant , progressive  cognitive decline  has  hx of alcohol, abuse in the past. \par \par \par She has mild prediabetes \par \par Was unable to keep her recent appointment for nuclear stress test

## 2021-09-01 NOTE — PHYSICAL EXAM
[Well Developed] : well developed [Well Nourished] : well nourished [No Acute Distress] : no acute distress [Normal Conjunctiva] : normal conjunctiva [No Xanthelasma] : no xanthelasma [Normal Venous Pressure] : normal venous pressure [No Carotid Bruit] : no carotid bruit [Normal S1, S2] : normal S1, S2 [No Murmur] : no murmur [No Rub] : no rub [No Gallop] : no gallop [Good Air Entry] : good air entry [Clear Lung Fields] : clear lung fields [No Respiratory Distress] : no respiratory distress  [Normal Gait] : normal gait [Gait - Sufficient for Exercise Testing] : gait - sufficient for exercise testing [No Cyanosis] : no cyanosis [No Edema] : no edema [No Clubbing] : no clubbing [No Varicosities] : no varicosities [No Rash] : no rash [Moves all extremities] : moves all extremities [No Skin Lesions] : no skin lesions [No Focal Deficits] : no focal deficits [Normal Speech] : normal speech [Cognitive Impairment] : cognitive impairment [Appears Anxious] : appears anxious [de-identified] : oriented to person

## 2021-09-01 NOTE — ASSESSMENT
[FreeTextEntry1] : marked cognitive decline \par \par folate deficiency \par \par CAD\par \par mitral regurgitation\par \par hoarding

## 2021-09-01 NOTE — HISTORY OF PRESENT ILLNESS
[FreeTextEntry1] : Presents with her sister  \par \par Requires echocardiogram follow up today  to assess mitral regurgitation status \par \par Sister and daughter report that she is a hoarder  and  could not make it to her recent appointments because she forgot where my office was located  although she has been here numerous times in the past alone  and she lives across the street and  down the block\par \par Recent eye infection has completely resolved , she saw Dr. Shore twice \par \par Daughter reports  that her mother is drinking wine \par \par She perseverates  and  though pleasant , she is  confused , and  sundowns  around 4 pm daily  according to sister \par \par [facility]  and  can no longer make decisions regarding her health . It is dangerous for her to live alone. \par \par We identified low folate previously  and prescribed folate  but she has not taken it.  It is unclear if she is taking any of her prescribed medications

## 2021-09-29 ENCOUNTER — NON-APPOINTMENT (OUTPATIENT)
Age: 81
End: 2021-09-29

## 2021-09-29 ENCOUNTER — APPOINTMENT (OUTPATIENT)
Dept: NEUROLOGY | Facility: CLINIC | Age: 81
End: 2021-09-29
Payer: MEDICARE

## 2021-09-29 VITALS
TEMPERATURE: 97.4 F | DIASTOLIC BLOOD PRESSURE: 60 MMHG | WEIGHT: 142 LBS | BODY MASS INDEX: 26.13 KG/M2 | OXYGEN SATURATION: 92 % | HEART RATE: 70 BPM | HEIGHT: 62 IN | SYSTOLIC BLOOD PRESSURE: 110 MMHG

## 2021-09-29 DIAGNOSIS — R41.3 OTHER AMNESIA: ICD-10-CM

## 2021-09-29 DIAGNOSIS — Z00.00 ENCOUNTER FOR GENERAL ADULT MEDICAL EXAMINATION W/OUT ABNORMAL FINDINGS: ICD-10-CM

## 2021-09-29 PROCEDURE — 99205 OFFICE O/P NEW HI 60 MIN: CPT

## 2021-09-30 ENCOUNTER — NON-APPOINTMENT (OUTPATIENT)
Age: 81
End: 2021-09-30

## 2021-10-06 PROBLEM — N81.10 FEMALE BLADDER PROLAPSE: Status: ACTIVE | Noted: 2019-11-21

## 2021-10-09 ENCOUNTER — OUTPATIENT (OUTPATIENT)
Dept: OUTPATIENT SERVICES | Facility: HOSPITAL | Age: 81
LOS: 1 days | End: 2021-10-09
Payer: MEDICARE

## 2021-10-09 ENCOUNTER — TRANSCRIPTION ENCOUNTER (OUTPATIENT)
Age: 81
End: 2021-10-09

## 2021-10-09 ENCOUNTER — APPOINTMENT (OUTPATIENT)
Dept: MRI IMAGING | Facility: HOSPITAL | Age: 81
End: 2021-10-09
Payer: MEDICARE

## 2021-10-09 PROCEDURE — 70551 MRI BRAIN STEM W/O DYE: CPT | Mod: 26,MH

## 2021-10-09 PROCEDURE — 70551 MRI BRAIN STEM W/O DYE: CPT

## 2021-10-13 ENCOUNTER — NON-APPOINTMENT (OUTPATIENT)
Age: 81
End: 2021-10-13

## 2021-10-13 DIAGNOSIS — I63.81 OTHER CEREBRAL INFARCTION DUE TO OCCLUSION OR STENOSIS OF SMALL ARTERY: ICD-10-CM

## 2021-10-13 RX ORDER — ASPIRIN 81 MG/1
81 TABLET ORAL DAILY
Qty: 30 | Refills: 5 | Status: ACTIVE | COMMUNITY
Start: 2021-10-13 | End: 1900-01-01

## 2021-10-15 ENCOUNTER — APPOINTMENT (OUTPATIENT)
Dept: NEUROLOGY | Facility: CLINIC | Age: 81
End: 2021-10-15
Payer: MEDICARE

## 2021-10-15 PROCEDURE — 96132 NRPSYC TST EVAL PHYS/QHP 1ST: CPT

## 2021-10-15 PROCEDURE — 96138 PSYCL/NRPSYC TECH 1ST: CPT | Mod: NC

## 2021-10-15 PROCEDURE — 96116 NUBHVL XM PHYS/QHP 1ST HR: CPT

## 2021-10-15 PROCEDURE — 96133 NRPSYC TST EVAL PHYS/QHP EA: CPT

## 2021-10-15 PROCEDURE — 96139 PSYCL/NRPSYC TST TECH EA: CPT | Mod: NC

## 2021-10-18 ENCOUNTER — TRANSCRIPTION ENCOUNTER (OUTPATIENT)
Age: 81
End: 2021-10-18

## 2021-11-03 ENCOUNTER — APPOINTMENT (OUTPATIENT)
Dept: NEUROLOGY | Facility: CLINIC | Age: 81
End: 2021-11-03
Payer: MEDICARE

## 2021-11-03 PROCEDURE — 96133 NRPSYC TST EVAL PHYS/QHP EA: CPT | Mod: 95

## 2021-11-16 ENCOUNTER — APPOINTMENT (OUTPATIENT)
Dept: NEUROLOGY | Facility: CLINIC | Age: 81
End: 2021-11-16

## 2021-11-22 ENCOUNTER — RX RENEWAL (OUTPATIENT)
Age: 81
End: 2021-11-22

## 2021-12-01 ENCOUNTER — RX RENEWAL (OUTPATIENT)
Age: 81
End: 2021-12-01

## 2021-12-15 RX ORDER — ESCITALOPRAM OXALATE 10 MG/1
10 TABLET ORAL
Qty: 90 | Refills: 1 | Status: DISCONTINUED | COMMUNITY
Start: 2018-03-12 | End: 2021-12-15

## 2021-12-16 ENCOUNTER — RX RENEWAL (OUTPATIENT)
Age: 81
End: 2021-12-16

## 2021-12-16 RX ORDER — FOLIC ACID 1 MG/1
1 TABLET ORAL DAILY
Qty: 90 | Refills: 1 | Status: ACTIVE | COMMUNITY
Start: 2021-08-31 | End: 1900-01-01

## 2021-12-27 RX ORDER — EZETIMIBE AND SIMVASTATIN 10; 20 MG/1; MG/1
10-20 TABLET ORAL
Qty: 90 | Refills: 1 | Status: DISCONTINUED | COMMUNITY
Start: 2020-01-07 | End: 2021-12-27

## 2022-01-07 ENCOUNTER — TRANSCRIPTION ENCOUNTER (OUTPATIENT)
Age: 82
End: 2022-01-07

## 2022-01-10 ENCOUNTER — TRANSCRIPTION ENCOUNTER (OUTPATIENT)
Age: 82
End: 2022-01-10

## 2022-01-13 ENCOUNTER — TRANSCRIPTION ENCOUNTER (OUTPATIENT)
Age: 82
End: 2022-01-13

## 2022-01-18 ENCOUNTER — TRANSCRIPTION ENCOUNTER (OUTPATIENT)
Age: 82
End: 2022-01-18

## 2022-01-19 ENCOUNTER — TRANSCRIPTION ENCOUNTER (OUTPATIENT)
Age: 82
End: 2022-01-19

## 2022-02-05 ENCOUNTER — TRANSCRIPTION ENCOUNTER (OUTPATIENT)
Age: 82
End: 2022-02-05

## 2022-02-07 ENCOUNTER — APPOINTMENT (OUTPATIENT)
Dept: HEART AND VASCULAR | Facility: CLINIC | Age: 82
End: 2022-02-07
Payer: MEDICARE

## 2022-02-07 ENCOUNTER — LABORATORY RESULT (OUTPATIENT)
Age: 82
End: 2022-02-07

## 2022-02-07 VITALS
WEIGHT: 143 LBS | HEIGHT: 62 IN | HEART RATE: 105 BPM | OXYGEN SATURATION: 95 % | SYSTOLIC BLOOD PRESSURE: 130 MMHG | DIASTOLIC BLOOD PRESSURE: 70 MMHG | TEMPERATURE: 96.6 F | BODY MASS INDEX: 26.31 KG/M2

## 2022-02-07 DIAGNOSIS — I34.1 NONRHEUMATIC MITRAL (VALVE) PROLAPSE: ICD-10-CM

## 2022-02-07 DIAGNOSIS — R41.89 OTHER SYMPTOMS AND SIGNS INVOLVING COGNITIVE FUNCTIONS AND AWARENESS: ICD-10-CM

## 2022-02-07 DIAGNOSIS — R06.00 DYSPNEA, UNSPECIFIED: ICD-10-CM

## 2022-02-07 DIAGNOSIS — I25.10 ATHEROSCLEROTIC HEART DISEASE OF NATIVE CORONARY ARTERY W/OUT ANGINA PECTORIS: ICD-10-CM

## 2022-02-07 DIAGNOSIS — F41.9 ANXIETY DISORDER, UNSPECIFIED: ICD-10-CM

## 2022-02-07 DIAGNOSIS — I34.0 NONRHEUMATIC MITRAL (VALVE) INSUFFICIENCY: ICD-10-CM

## 2022-02-07 DIAGNOSIS — E78.5 HYPERLIPIDEMIA, UNSPECIFIED: ICD-10-CM

## 2022-02-07 DIAGNOSIS — G47.00 INSOMNIA, UNSPECIFIED: ICD-10-CM

## 2022-02-07 PROCEDURE — 36415 COLL VENOUS BLD VENIPUNCTURE: CPT

## 2022-02-07 PROCEDURE — 99214 OFFICE O/P EST MOD 30 MIN: CPT

## 2022-02-07 PROCEDURE — 93000 ELECTROCARDIOGRAM COMPLETE: CPT

## 2022-02-07 RX ORDER — ALPRAZOLAM 0.25 MG/1
0.25 TABLET ORAL DAILY
Qty: 30 | Refills: 0 | Status: ACTIVE | COMMUNITY
Start: 2022-02-07 | End: 1900-01-01

## 2022-02-07 RX ORDER — QUETIAPINE 50 MG/1
50 TABLET, FILM COATED, EXTENDED RELEASE ORAL DAILY
Qty: 30 | Refills: 1 | Status: ACTIVE | COMMUNITY
Start: 2022-02-07 | End: 1900-01-01

## 2022-02-07 NOTE — ASSESSMENT
[FreeTextEntry1] : Compensated MR\par \par Alzheimer's dementia \par \par \par Hoarder \par \par \par anxiety disorder \par \par

## 2022-02-07 NOTE — HISTORY OF PRESENT ILLNESS
[FreeTextEntry1] : No hx of covid infection, she received  3 doses of covid vaccine \par \par Daughter explains patient is a hoarder   and she  has progressive cognitive decline and auditory hallucinations \par \par She has been evaluated by neurologist and had neuropsychiatric testing \par \par She is not taking prescribed medications\par \par Daughter wants her mother to relocate to [place] where other family members are  who can watch her \par \par Patient denies chest pain, syncope, sustained palpitations \par \par She denies recent falls \par \par Lost her hearing aides several times already \par \par Daughter  says her mother get short of breath when walking \par \par EKG shows NSR  92 bpm  with left atrial enlargement without ischemia or  ectopy . No pathologic Q waves

## 2022-02-07 NOTE — PHYSICAL EXAM
[Well Developed] : well developed [Well Nourished] : well nourished [No Acute Distress] : no acute distress [Normal Conjunctiva] : normal conjunctiva [No Xanthelasma] : no xanthelasma [Normal Venous Pressure] : normal venous pressure [No Carotid Bruit] : no carotid bruit [Normal S1, S2] : normal S1, S2 [No Murmur] : no murmur [No Rub] : no rub [No Gallop] : no gallop [Clear Lung Fields] : clear lung fields [Good Air Entry] : good air entry [No Respiratory Distress] : no respiratory distress  [Soft] : abdomen soft [Non Tender] : non-tender [No Masses/organomegaly] : no masses/organomegaly [Normal Bowel Sounds] : normal bowel sounds [Normal Gait] : normal gait [Gait - Sufficient for Exercise Testing] : gait - sufficient for exercise testing [No Cyanosis] : no cyanosis [No Clubbing] : no clubbing [No Varicosities] : no varicosities [Edema ___] : edema [unfilled] [No Rash] : no rash [No Skin Lesions] : no skin lesions [Moves all extremities] : moves all extremities [No Focal Deficits] : no focal deficits [Normal Speech] : normal speech [Cognitive Impairment] : cognitive impairment [Appears Anxious] : appears anxious [de-identified] : oriented to person

## 2022-02-07 NOTE — DISCUSSION/SUMMARY
[Moderate Mitral Regurgitation] : moderate mitral regurgitation [Compensated] : compensated [None] : There are no changes in medication management [With Me] : with me [___ Month(s)] : in [unfilled] month(s) [FreeTextEntry1] : alprazolam 0.25mg po qd prn anxiety\par \par Seroquel ER 50mg q hs \par \par I agree with moving patient to [place] \par \par patient should not be left home alone unattended

## 2022-02-07 NOTE — REVIEW OF SYSTEMS
[Dyspnea on exertion] : dyspnea during exertion [Confusion] : confusion was observed [Memory Lapses Or Loss] : memory lapses or loss [Depression] : no depression [Anxiety] : anxiety [Under Stress] : not under stress [Suicidal] : not suicidal [Negative] : Heme/Lymph [FreeTextEntry1] : auditory hallucinations

## 2022-02-08 ENCOUNTER — APPOINTMENT (OUTPATIENT)
Dept: HEART AND VASCULAR | Facility: CLINIC | Age: 82
End: 2022-02-08

## 2022-02-08 LAB
25(OH)D3 SERPL-MCNC: 42.6 NG/ML
ALBUMIN SERPL ELPH-MCNC: 4.3 G/DL
ALP BLD-CCNC: 89 U/L
ALT SERPL-CCNC: 13 U/L
ANION GAP SERPL CALC-SCNC: 14 MMOL/L
APPEARANCE: ABNORMAL
AST SERPL-CCNC: 18 U/L
BASOPHILS # BLD AUTO: 0.11 K/UL
BASOPHILS NFR BLD AUTO: 1.6 %
BILIRUB SERPL-MCNC: 1.2 MG/DL
BILIRUBIN URINE: NEGATIVE
BLOOD URINE: ABNORMAL
BUN SERPL-MCNC: 29 MG/DL
CALCIUM SERPL-MCNC: 10 MG/DL
CHLORIDE SERPL-SCNC: 103 MMOL/L
CHOLEST SERPL-MCNC: 158 MG/DL
CO2 SERPL-SCNC: 24 MMOL/L
COLOR: YELLOW
COVID-19 NUCLEOCAPSID  GAM ANTIBODY INTERPRETATION: NEGATIVE
COVID-19 SPIKE DOMAIN ANTIBODY INTERPRETATION: POSITIVE
CREAT SERPL-MCNC: 0.99 MG/DL
CREAT SPEC-SCNC: 216 MG/DL
EOSINOPHIL # BLD AUTO: 0.6 K/UL
EOSINOPHIL NFR BLD AUTO: 8.7 %
ESTIMATED AVERAGE GLUCOSE: 126 MG/DL
FOLATE SERPL-MCNC: 11.2 NG/ML
GLUCOSE QUALITATIVE U: NEGATIVE
GLUCOSE SERPL-MCNC: 105 MG/DL
HBA1C MFR BLD HPLC: 6 %
HCT VFR BLD CALC: 38.3 %
HDLC SERPL-MCNC: 84 MG/DL
HGB BLD-MCNC: 11.8 G/DL
IMM GRANULOCYTES NFR BLD AUTO: 0.3 %
KETONES URINE: NEGATIVE
LDLC SERPL CALC-MCNC: 53 MG/DL
LEUKOCYTE ESTERASE URINE: ABNORMAL
LYMPHOCYTES # BLD AUTO: 1.03 K/UL
LYMPHOCYTES NFR BLD AUTO: 14.9 %
MAN DIFF?: NORMAL
MCHC RBC-ENTMCNC: 27.1 PG
MCHC RBC-ENTMCNC: 30.8 GM/DL
MCV RBC AUTO: 88 FL
MICROALBUMIN 24H UR DL<=1MG/L-MCNC: 4.8 MG/DL
MICROALBUMIN/CREAT 24H UR-RTO: 22 MG/G
MONOCYTES # BLD AUTO: 0.6 K/UL
MONOCYTES NFR BLD AUTO: 8.7 %
NEUTROPHILS # BLD AUTO: 4.56 K/UL
NEUTROPHILS NFR BLD AUTO: 65.8 %
NITRITE URINE: NEGATIVE
NONHDLC SERPL-MCNC: 74 MG/DL
PH URINE: 5.5
PLATELET # BLD AUTO: 250 K/UL
POTASSIUM SERPL-SCNC: 4.1 MMOL/L
PROT SERPL-MCNC: 7.2 G/DL
PROTEIN URINE: NORMAL
RBC # BLD: 4.35 M/UL
RBC # FLD: 15.9 %
SARS-COV-2 AB SERPL IA-ACNC: >250 U/ML
SARS-COV-2 AB SERPL QL IA: 0.07 INDEX
SODIUM SERPL-SCNC: 141 MMOL/L
SPECIFIC GRAVITY URINE: >=1.03
TRIGL SERPL-MCNC: 107 MG/DL
TSH SERPL-ACNC: 2.53 UIU/ML
UROBILINOGEN URINE: NORMAL
VIT B12 SERPL-MCNC: 863 PG/ML
WBC # FLD AUTO: 6.92 K/UL

## 2022-02-20 ENCOUNTER — RX RENEWAL (OUTPATIENT)
Age: 82
End: 2022-02-20

## 2022-02-20 RX ORDER — AZELASTINE HYDROCHLORIDE 137 UG/1
0.1 SPRAY, METERED NASAL
Qty: 1 | Refills: 2 | Status: ACTIVE | COMMUNITY
Start: 2019-07-31 | End: 1900-01-01

## 2022-02-24 ENCOUNTER — RX RENEWAL (OUTPATIENT)
Age: 82
End: 2022-02-24

## 2022-03-11 ENCOUNTER — TRANSCRIPTION ENCOUNTER (OUTPATIENT)
Age: 82
End: 2022-03-11

## 2022-03-14 ENCOUNTER — TRANSCRIPTION ENCOUNTER (OUTPATIENT)
Age: 82
End: 2022-03-14

## 2022-03-31 ENCOUNTER — RX RENEWAL (OUTPATIENT)
Age: 82
End: 2022-03-31

## 2022-03-31 RX ORDER — QUETIAPINE FUMARATE 25 MG/1
25 TABLET ORAL
Qty: 30 | Refills: 0 | Status: ACTIVE | COMMUNITY
Start: 2019-11-12 | End: 1900-01-01

## 2022-04-28 ENCOUNTER — FORM ENCOUNTER (OUTPATIENT)
Age: 82
End: 2022-04-28

## 2022-04-28 ENCOUNTER — TRANSCRIPTION ENCOUNTER (OUTPATIENT)
Age: 82
End: 2022-04-28

## 2022-05-20 ENCOUNTER — RX RENEWAL (OUTPATIENT)
Age: 82
End: 2022-05-20

## 2022-05-23 ENCOUNTER — RX RENEWAL (OUTPATIENT)
Age: 82
End: 2022-05-23

## 2022-05-23 RX ORDER — ASPIRIN 81 MG/1
81 TABLET, COATED ORAL
Qty: 30 | Refills: 2 | Status: ACTIVE | COMMUNITY
Start: 2022-05-23 | End: 1900-01-01

## 2022-05-23 RX ORDER — MEMANTINE HYDROCHLORIDE 5 MG/1
5 TABLET, FILM COATED ORAL
Qty: 60 | Refills: 2 | Status: ACTIVE | COMMUNITY
Start: 2021-09-29 | End: 1900-01-01

## 2022-08-22 ENCOUNTER — RX RENEWAL (OUTPATIENT)
Age: 82
End: 2022-08-22

## 2023-10-25 NOTE — PROGRESS NOTE BEHAVIORAL HEALTH - NSBHCHARTREVIEWIMAGING_PSY_A_CORE FT
no CT BRAIN                          PROCEDURE DATE:  10/17/2019          INTERPRETATION:  CT OF THE HEAD WITHOUT CONTRAST    INDICATION:  ams    MPRESSION:  No hydrocephalus, midline shift, acute intracranial hemorrhage or   demarcated territorial infarct.

## 2024-08-20 NOTE — DIETITIAN INITIAL EVALUATION ADULT. - DIET TYPE
- CT lumbar with moderate to severe L3-4 central canal stenosis, moderate to severe b/l L5-S1 foraminal stenoses  - previously saw ortho-spine outpatient, currently being managed by ortho-pain management with vicodin 10-325mg q4h PRN (I-STOP#: [id number])  - pain management here with tylenol PRN mild pain, oxy 5 PRN moderate, oxy 10 PRN severe pain   - outpatient follow up oral DASH/TLC (sodium and cholesterol restricted diet)

## 2024-09-19 NOTE — ED ADULT NURSE NOTE - CAS DISCH CONDITION
Cyclosporin level 123 at 12 hours, on 9/18.  Goal 120-140.   Current dose 100 mg in AM, 100 mg in PM    No dose change at this time.   Recheck level in 2 weeks.     Discussed with patient, confirmed 12hr level.   Snapette message sent.     Stable

## 2025-03-18 NOTE — ED ADULT NURSE NOTE - OBJECTIVE STATEMENT
.Patient Name: Pamela Rao     Procedure Date: 5/1/25    You have been scheduled for a Transcatheter Mitral Valve Repair with Dr. Escalante     ARRIVAL TIME- A nurse from the Cardiac Cath Lab will call you AFTER 3PM the day prior to your procedure.       Preparations for the Procedure:    You will need pre pre-procedure labs drawn anytime the week of April 14th. Please have these drawn at the ACL Lab in the [place] [facility], [address] [place] [address].   No appointments are necessary. Hours: Monday - Friday 6:30 am - 5:00 pm Saturday - 6:30 am - 2:00 pm   You do not need to fast.   Instruct them that you are here for pre procedure labs.     Do not eat or drink anything after midnight.     Medication instructions:   HOLD all herbal supplements and vitamin E for 2 weeks.  STOP taking Eliquis on 4/23. Start Lovenox injections on 4/24. Last dose for Lovenox on 4/30 AM.  The morning of the procedure, please take the following medications only with a small sip of water:  Levothyroxine  Metoprolol succinate       You will need a family member or friend to bring you to the hospital and bring you home. You will be staying in the hospital overnight. Per hospital policy you are not allowed to drive yourself home or take a taxi/[employer].           Parking and directions to registration and Cardiac Cath Lab:  -Parking is available in the garage adjacent to the hospital.  parking is available at the Main Entrance of the hospital from 6am-6pm.  -The day of your procedure come in at the [facility] entrance on [address] [facility] [id number]. Take Elevator P then press G for the ground floor. Registration desk is to the right of the Fitness Center.   -After you are done with registration, you will be directed to the Cardiac Cath Lab to be prepped for your procedure.         After Procedure:   -You will be scheduled for a follow up visit in 5-7 days with your primary cardiologist or Nurse Practitioner for  a site check.   - You will be scheduled for a follow up with the MD approximately 30 days after your procedure at [facility] [address] at [facility].        Please call Denae Aquino Valve Nurse Navigator [phone] or MANDY Pleitez [phone] with any questions       Pt directed to ED from [facility] Left Ear Pressure 8/10, Post Nasal gtt, and LLE sensation changes x3 weeks.  Pt, visibly anxious upon arrival to ED, states "I took a 1mg  Alprazolam about an hour ago and I'm not sure if this is an anxiety attack or if I'm having a stroke but no ones been able to help me with the Leg or my Ear."  Pt describes LLE as duller than RLE, but pt is able to ambulate with steady gait.  Pt denies N/V/D, SOB, Fevers at this time.